# Patient Record
Sex: FEMALE | Race: WHITE | Employment: FULL TIME | URBAN - METROPOLITAN AREA
[De-identification: names, ages, dates, MRNs, and addresses within clinical notes are randomized per-mention and may not be internally consistent; named-entity substitution may affect disease eponyms.]

---

## 2017-06-08 ENCOUNTER — HOSPITAL ENCOUNTER (OUTPATIENT)
Dept: ULTRASOUND IMAGING | Age: 32
Discharge: HOME | End: 2017-06-08
Attending: OBSTETRICS & GYNECOLOGY | Admitting: OBSTETRICS & GYNECOLOGY

## 2017-06-08 DIAGNOSIS — N92.1 BREAKTHROUGH BLEEDING: ICD-10-CM

## 2017-06-08 DIAGNOSIS — N92.1 EXCESSIVE AND FREQUENT MENSTRUATION WITH IRREGULAR CYCLE: ICD-10-CM

## 2017-06-12 ENCOUNTER — OFFICE VISIT (OUTPATIENT)
Dept: FAMILY MEDICINE CLINIC | Age: 32
End: 2017-06-12

## 2017-06-12 VITALS
DIASTOLIC BLOOD PRESSURE: 72 MMHG | RESPIRATION RATE: 14 BRPM | SYSTOLIC BLOOD PRESSURE: 122 MMHG | HEART RATE: 74 BPM | OXYGEN SATURATION: 98 % | BODY MASS INDEX: 18.74 KG/M2 | HEIGHT: 66 IN | WEIGHT: 116.6 LBS

## 2017-06-12 DIAGNOSIS — R31.9 HEMATURIA: Primary | ICD-10-CM

## 2017-06-12 DIAGNOSIS — R93.41 ABNORMAL ULTRASOUND OF BLADDER: ICD-10-CM

## 2017-06-12 DIAGNOSIS — T63.441A LOCAL REACTION TO BEE STING, ACCIDENTAL OR UNINTENTIONAL, INITIAL ENCOUNTER: ICD-10-CM

## 2017-06-12 LAB
BILIRUBIN, POC: NORMAL
BLOOD URINE, POC: NORMAL
CLARITY, POC: CLEAR
COLOR, POC: YELLOW
EPITHELIAL CELLS, UA: 2 /HPF (ref 0–5)
GLUCOSE URINE, POC: NORMAL
HYALINE CASTS: 2 /LPF (ref 0–8)
KETONES, POC: NORMAL
LEUKOCYTE EST, POC: NORMAL
NITRITE, POC: NORMAL
PH, POC: 5.5
PROTEIN, POC: NORMAL
RBC UA: 2 /HPF (ref 0–4)
SPECIFIC GRAVITY, POC: 1.01
UROBILINOGEN, POC: 0.2
WBC UA: 1 /HPF (ref 0–5)

## 2017-06-12 PROCEDURE — 81002 URINALYSIS NONAUTO W/O SCOPE: CPT | Performed by: FAMILY MEDICINE

## 2017-06-12 PROCEDURE — 99214 OFFICE O/P EST MOD 30 MIN: CPT | Performed by: FAMILY MEDICINE

## 2017-06-12 RX ORDER — SULFAMETHOXAZOLE AND TRIMETHOPRIM 800; 160 MG/1; MG/1
1 TABLET ORAL 2 TIMES DAILY
Qty: 14 TABLET | Refills: 0 | Status: SHIPPED | OUTPATIENT
Start: 2017-06-12 | End: 2017-06-19

## 2017-06-12 ASSESSMENT — ENCOUNTER SYMPTOMS
NAUSEA: 0
VOMITING: 0

## 2017-06-14 LAB — URINE CULTURE, ROUTINE: NORMAL

## 2017-06-15 ENCOUNTER — TELEPHONE (OUTPATIENT)
Dept: FAMILY MEDICINE CLINIC | Age: 32
End: 2017-06-15

## 2017-06-15 DIAGNOSIS — N32.89 BLOOD CLOT IN BLADDER: Primary | ICD-10-CM

## 2017-10-09 ENCOUNTER — OFFICE VISIT (OUTPATIENT)
Dept: FAMILY MEDICINE CLINIC | Age: 32
End: 2017-10-09

## 2017-10-09 VITALS
WEIGHT: 113.4 LBS | TEMPERATURE: 98.5 F | BODY MASS INDEX: 18.3 KG/M2 | DIASTOLIC BLOOD PRESSURE: 70 MMHG | OXYGEN SATURATION: 96 % | HEART RATE: 78 BPM | SYSTOLIC BLOOD PRESSURE: 112 MMHG

## 2017-10-09 DIAGNOSIS — R10.11 RUQ PAIN: Primary | ICD-10-CM

## 2017-10-09 PROCEDURE — 99213 OFFICE O/P EST LOW 20 MIN: CPT | Performed by: INTERNAL MEDICINE

## 2017-10-09 ASSESSMENT — PATIENT HEALTH QUESTIONNAIRE - PHQ9
1. LITTLE INTEREST OR PLEASURE IN DOING THINGS: 0
2. FEELING DOWN, DEPRESSED OR HOPELESS: 0
SUM OF ALL RESPONSES TO PHQ QUESTIONS 1-9: 0
SUM OF ALL RESPONSES TO PHQ9 QUESTIONS 1 & 2: 0

## 2017-10-15 ASSESSMENT — ENCOUNTER SYMPTOMS
CONSTIPATION: 0
DIARRHEA: 1
BLOOD IN STOOL: 0
ABDOMINAL PAIN: 1

## 2017-10-15 NOTE — PROGRESS NOTES
Subjective:      Patient ID: Roxanne Pringle is a 27 y.o. female. HPI  Acute visit with complaint of epigastric and right flank pain for the past year without abnormalities uncovered. Would like referral for further evaluation of gallbladder. Notes she will have abdominal cramping and sometimes diarrhea triggered by food. This week brisket from [employer] 3 times. Patient's medications, allergies, past medical, surgical, social and family histories were reviewed and updated as appropriate. Review of Systems   Constitutional: Negative for fever and unexpected weight change. Gastrointestinal: Positive for abdominal pain and diarrhea. Negative for blood in stool and constipation. Objective:   Physical Exam   Constitutional: She is oriented to person, place, and time. She appears well-developed and well-nourished. No distress. Eyes: Conjunctivae are normal. No scleral icterus. Neck: No thyromegaly present. Cardiovascular: Normal rate, regular rhythm and normal heart sounds. No murmur heard. Pulmonary/Chest: Effort normal and breath sounds normal. No respiratory distress. She has no wheezes. Abdominal: Soft. She exhibits no distension and no mass. Bowel sounds are increased. There is no tenderness. There is no rebound and no guarding. Musculoskeletal: She exhibits no edema. Neurological: She is alert and oriented to person, place, and time. Skin: No rash noted. Psychiatric: She has a normal mood and affect. Vitals reviewed. Assessment:      Evelia Lanes was seen today for flank pain. Diagnoses and all orders for this visit:    RUQ pain  -     COMPREHENSIVE METABOLIC PANEL; Future  -     CBC WITH AUTO DIFFERENTIAL; Future  -     US Gallbladder Ruq; Future    check test for gallbladder, but consider IBS and trial of fiber first, consider bentyl or levbid. Plan:      As above and per orders.

## 2017-11-21 ENCOUNTER — HOSPITAL ENCOUNTER (OUTPATIENT)
Dept: SURGERY | Age: 32
Discharge: HOME | End: 2017-11-21
Attending: OBSTETRICS & GYNECOLOGY | Admitting: OBSTETRICS & GYNECOLOGY

## 2017-11-21 VITALS
BODY MASS INDEX: 18.05 KG/M2 | WEIGHT: 115 LBS | SYSTOLIC BLOOD PRESSURE: 100 MMHG | RESPIRATION RATE: 12 BRPM | OXYGEN SATURATION: 100 % | HEART RATE: 64 BPM | TEMPERATURE: 98.1 F | HEIGHT: 67 IN | DIASTOLIC BLOOD PRESSURE: 61 MMHG

## 2017-11-21 LAB — PREGNANCY, URINE: NEGATIVE

## 2017-11-21 RX ORDER — ONDANSETRON 2 MG/ML
4 INJECTION INTRAMUSCULAR; INTRAVENOUS PRN
Status: DISCONTINUED | OUTPATIENT
Start: 2017-11-21 | End: 2017-11-22 | Stop reason: HOSPADM

## 2017-11-21 RX ORDER — SODIUM CHLORIDE, SODIUM LACTATE, POTASSIUM CHLORIDE, CALCIUM CHLORIDE 600; 310; 30; 20 MG/100ML; MG/100ML; MG/100ML; MG/100ML
INJECTION, SOLUTION INTRAVENOUS CONTINUOUS
Status: DISCONTINUED | OUTPATIENT
Start: 2017-11-21 | End: 2017-11-22 | Stop reason: HOSPADM

## 2017-11-21 RX ORDER — LABETALOL HYDROCHLORIDE 5 MG/ML
5 INJECTION, SOLUTION INTRAVENOUS EVERY 10 MIN PRN
Status: DISCONTINUED | OUTPATIENT
Start: 2017-11-21 | End: 2017-11-22 | Stop reason: HOSPADM

## 2017-11-21 RX ORDER — MORPHINE SULFATE 2 MG/ML
2 INJECTION, SOLUTION INTRAMUSCULAR; INTRAVENOUS EVERY 5 MIN PRN
Status: DISCONTINUED | OUTPATIENT
Start: 2017-11-21 | End: 2017-11-22 | Stop reason: HOSPADM

## 2017-11-21 RX ORDER — OXYCODONE HYDROCHLORIDE AND ACETAMINOPHEN 5; 325 MG/1; MG/1
1 TABLET ORAL PRN
Status: ACTIVE | OUTPATIENT
Start: 2017-11-21 | End: 2017-11-21

## 2017-11-21 RX ORDER — OXYCODONE HYDROCHLORIDE AND ACETAMINOPHEN 5; 325 MG/1; MG/1
2 TABLET ORAL PRN
Status: ACTIVE | OUTPATIENT
Start: 2017-11-21 | End: 2017-11-21

## 2017-11-21 RX ORDER — MIDAZOLAM HYDROCHLORIDE 1 MG/ML
INJECTION INTRAMUSCULAR; INTRAVENOUS
Status: DISPENSED
Start: 2017-11-21 | End: 2017-11-21

## 2017-11-21 RX ORDER — DIPHENHYDRAMINE HYDROCHLORIDE 50 MG/ML
12.5 INJECTION INTRAMUSCULAR; INTRAVENOUS
Status: ACTIVE | OUTPATIENT
Start: 2017-11-21 | End: 2017-11-21

## 2017-11-21 RX ORDER — PROMETHAZINE HYDROCHLORIDE 25 MG/ML
6.25 INJECTION, SOLUTION INTRAMUSCULAR; INTRAVENOUS
Status: DISCONTINUED | OUTPATIENT
Start: 2017-11-21 | End: 2017-11-22 | Stop reason: HOSPADM

## 2017-11-21 RX ORDER — MEPERIDINE HYDROCHLORIDE 50 MG/ML
12.5 INJECTION INTRAMUSCULAR; INTRAVENOUS; SUBCUTANEOUS EVERY 5 MIN PRN
Status: DISCONTINUED | OUTPATIENT
Start: 2017-11-21 | End: 2017-11-22 | Stop reason: HOSPADM

## 2017-11-21 RX ORDER — MIDAZOLAM HYDROCHLORIDE 1 MG/ML
2 INJECTION INTRAMUSCULAR; INTRAVENOUS ONCE
Status: COMPLETED | OUTPATIENT
Start: 2017-11-21 | End: 2017-11-21

## 2017-11-21 RX ORDER — MORPHINE SULFATE 2 MG/ML
1 INJECTION, SOLUTION INTRAMUSCULAR; INTRAVENOUS EVERY 5 MIN PRN
Status: DISCONTINUED | OUTPATIENT
Start: 2017-11-21 | End: 2017-11-22 | Stop reason: HOSPADM

## 2017-11-21 RX ORDER — HYDRALAZINE HYDROCHLORIDE 20 MG/ML
5 INJECTION INTRAMUSCULAR; INTRAVENOUS EVERY 10 MIN PRN
Status: DISCONTINUED | OUTPATIENT
Start: 2017-11-21 | End: 2017-11-22 | Stop reason: HOSPADM

## 2017-11-21 RX ADMIN — SODIUM CHLORIDE, SODIUM LACTATE, POTASSIUM CHLORIDE, CALCIUM CHLORIDE: 600; 310; 30; 20 INJECTION, SOLUTION INTRAVENOUS at 10:29

## 2017-11-21 RX ADMIN — MIDAZOLAM HYDROCHLORIDE 2 MG: 1 INJECTION INTRAMUSCULAR; INTRAVENOUS at 10:30

## 2017-11-21 ASSESSMENT — PAIN SCALES - GENERAL
PAINLEVEL_OUTOF10: 0

## 2017-11-21 ASSESSMENT — PAIN - FUNCTIONAL ASSESSMENT: PAIN_FUNCTIONAL_ASSESSMENT: 0-10

## 2017-11-21 ASSESSMENT — PAIN DESCRIPTION - LOCATION: LOCATION: ABDOMEN

## 2017-11-21 NOTE — ANESTHESIA PRE-OP
Department of Anesthesiology  Preprocedure Note       Name:  Hermann Landry   Age:  27 y.o.  :  1985                                          MRN:  [id number]         Date:  2017      Surgeon:    Procedure:    Medications prior to admission:   Prior to Admission medications    Medication Sig Start Date End Date Taking? Authorizing Provider   IBUPROFEN PO Take by mouth as needed    Historical Provider, MD       Current medications:    Current Outpatient Prescriptions   Medication Sig Dispense Refill    IBUPROFEN PO Take by mouth as needed       No current facility-administered medications for this encounter. Allergies: Allergies   Allergen Reactions    Cephalexin      Family History Only    Terazol [Terconazole] Swelling       Problem List:    Patient Active Problem List   Diagnosis Code    Raynaud disease I73.00    Encounter for supervision of normal first pregnancy in third trimester Z34.03    Prolonged latent phase of labor O62.0    40 weeks gestation of pregnancy Z3A.40    Pregnancy with history of infertility in third trimester O09.03    Raynaud's syndrome I73.00       Past Medical History:        Diagnosis Date    Anemia     Endometrial polyp 2011    Gastric reflux     during pregnancy    Infertility, female     iui for pregnancy    Menorrhagia     [place] teeth extracted        Past Surgical History:        Procedure Laterality Date    DILATION AND CURETTAGE OF UTERUS      DILATION AND CURETTAGE OF UTERUS  2012    DILATATION AND CURETTAGE, HYSTEROSCOPY    NASAL SEPTUM SURGERY         Social History:    Social History   Substance Use Topics    Smoking status: Never Smoker    Smokeless tobacco: Never Used      Comment: 7 YRS AGO, RARELY    Alcohol use No      Comment: 1-2 drinks per year                                Counseling given: Not Answered      Vital Signs (Current): There were no vitals filed for this visit.                                            BP Readings from Last 3 Encounters:   10/09/17 112/70   06/12/17 122/72   06/07/16 110/70       NPO Status:                                                                                 BMI:   Wt Readings from Last 3 Encounters:   11/15/17 115 lb (52.2 kg)   10/09/17 113 lb 6.4 oz (51.4 kg)   06/12/17 116 lb 9.6 oz (52.9 kg)     There is no height or weight on file to calculate BMI. Anesthesia Evaluation  Patient summary reviewed and Nursing notes reviewed no history of anesthetic complications:   Airway: Mallampati: II     Neck ROM: full   Dental:          Pulmonary:Negative Pulmonary ROS and normal exam                               Cardiovascular:Negative CV ROS                      Neuro/Psych:   Negative Neuro/Psych ROS              GI/Hepatic/Renal: Neg GI/Hepatic/Renal ROS  (+) GERD: well controlled,      (-) hiatal hernia       Endo/Other: Negative Endo/Other ROS                    Abdominal:           Vascular:                                      Anesthesia Plan      general     ASA 2     (I discussed with the patient the risks and benefits of PIV, general anesthesia, IV Narcotics, PACU. All questions were answered the patient agrees with the plan.)  Induction: intravenous. Pre-Operative Diagnosis: EXCESSIVE FREQUENT MENSTRUATION    27 y.o.   BMI:  Body mass index is 18.28 kg/m².      Vitals:    11/21/17 1021   BP: 130/85   Pulse: 93   Resp: 20   Temp: 98.3 °F (36.8 °C)   TempSrc: Oral   SpO2: 100%   Weight: 115 lb (52.2 kg)   Height: 5' 6.5\" (1.689 m)       Allergies   Allergen Reactions    Cephalexin      Family History Only      Terazol [Terconazole] Swelling       Social History   Substance Use Topics    Smoking status: Never Smoker    Smokeless tobacco: Never Used      Comment: 7 YRS AGO, RARELY    Alcohol use No      Comment: 1-2 drinks per year       LABS:    CBC  Lab Results   Component Value Date/Time    WBC 11.9 (H) 12/31/2015 11:35 PM    HGB 9.5 (L) 01/02/2016 01:21 PM HCT 42.5 12/31/2015 11:35 PM     (L) 12/31/2015 11:35 PM     RENAL  Lab Results   Component Value Date/Time     02/04/2015 09:58 AM    K 4.1 02/04/2015 09:58 AM     02/04/2015 09:58 AM    CO2 27 02/04/2015 09:58 AM    BUN 12 02/04/2015 09:58 AM    CREATININE 0.6 02/04/2015 09:58 AM    GLUCOSE 86 02/04/2015 09:58 AM     COAGS  No results found for: PROTIME, INR, APTT    Makayla Leyva MD   11/21/2017

## 2017-11-21 NOTE — PROGRESS NOTES
Patient arrive in recovery with oral airway in place.  Removed at [phone] -awakened with light touch-=spit airway out on awakening.-oriented rapidly

## 2017-12-08 NOTE — ANESTHESIA POST-OP
Postoperative Anesthesia Note    Name:    Margoth Wilson  MRN:      [id number]    No data found. LABS:    CBC  Lab Results   Component Value Date/Time    WBC 11.9 (H) 12/31/2015 11:35 PM    HGB 9.5 (L) 01/02/2016 01:21 PM    HCT 42.5 12/31/2015 11:35 PM     (L) 12/31/2015 11:35 PM     RENAL  Lab Results   Component Value Date/Time     02/04/2015 09:58 AM    K 4.1 02/04/2015 09:58 AM     02/04/2015 09:58 AM    CO2 27 02/04/2015 09:58 AM    BUN 12 02/04/2015 09:58 AM    CREATININE 0.6 02/04/2015 09:58 AM    GLUCOSE 86 02/04/2015 09:58 AM     COAGS  No results found for: PROTIME, INR, APTT    Intake & Output:  No intake/output data recorded. Nausea & Vomiting:  No    Level of Consciousness:  Awake    Pain Assessment:  Adequate analgesia    Anesthesia Complications:  No apparent anesthetic complications    SUMMARY      Vital signs stable  OK to discharge from Stage I post anesthesia care.   Care transferred from Anesthesiology department on discharge from perioperative area

## 2019-09-20 ENCOUNTER — OFFICE VISIT (OUTPATIENT)
Dept: FAMILY MEDICINE CLINIC | Age: 34
End: 2019-09-20
Payer: COMMERCIAL

## 2019-09-20 VITALS
BODY MASS INDEX: 20.83 KG/M2 | WEIGHT: 125 LBS | HEIGHT: 65 IN | DIASTOLIC BLOOD PRESSURE: 70 MMHG | SYSTOLIC BLOOD PRESSURE: 110 MMHG | HEART RATE: 74 BPM | OXYGEN SATURATION: 98 % | TEMPERATURE: 98.6 F

## 2019-09-20 DIAGNOSIS — Z00.00 ANNUAL PHYSICAL EXAM: Primary | ICD-10-CM

## 2019-09-20 PROCEDURE — 99395 PREV VISIT EST AGE 18-39: CPT | Performed by: INTERNAL MEDICINE

## 2019-09-20 ASSESSMENT — PATIENT HEALTH QUESTIONNAIRE - PHQ9
1. LITTLE INTEREST OR PLEASURE IN DOING THINGS: 0
SUM OF ALL RESPONSES TO PHQ9 QUESTIONS 1 & 2: 0
2. FEELING DOWN, DEPRESSED OR HOPELESS: 0
SUM OF ALL RESPONSES TO PHQ QUESTIONS 1-9: 0
SUM OF ALL RESPONSES TO PHQ QUESTIONS 1-9: 0

## 2019-09-20 NOTE — PROGRESS NOTES
activity: Not on file   Other Topics Concern    Not on file   Social History Narrative    Not on file       Review of Systems:  A comprehensive review of systems was negative except for what was noted in the HPI. Physical Exam:   Vitals:    09/20/19 1442   BP: 110/70   Site: Right Upper Arm   Position: Sitting   Cuff Size: Small Adult   Pulse: 74   Temp: 98.6 °F (37 °C)   TempSrc: Oral   SpO2: 98%   Weight: 125 lb (56.7 kg)   Height: 5' 5.35\" (1.66 m)     Body mass index is 20.58 kg/m². Constitutional: She is oriented to person, place, and time. She appears well-developed and well-nourished. No distress. HEENT:   Head: Normocephalic and atraumatic. Right Ear: Tympanic membrane, external ear and ear canal normal.   Left Ear: Tympanic membrane, external ear and ear canal normal.   Nose: Nose normal.   Mouth/Throat: Oropharynx is clear and moist, and mucous membranes are normal.  There is no cervical adenopathy. Eyes: Conjunctivae and extraocular motions are normal. Pupils are equal, round, and reactive to light. Neck: Neck supple. No JVD present. Carotid bruit is not present. No mass and no thyromegaly present. Cardiovascular: Normal rate, regular rhythm, normal heart sounds and intact distal pulses. Exam reveals no gallop and no friction rub. No murmur heard. Pulmonary/Chest: Effort normal and breath sounds normal. No respiratory distress. She has no wheezes, rhonchi or rales. Abdominal: Soft, non-tender. Bowel sounds and aorta are normal. She exhibits no organomegaly, mass or bruit. Genitourinary: examination not indicated. Breast exam:  performed by specialist.  Musculoskeletal: Normal range of motion, no synovitis. She exhibits no edema. Neurological: She is alert and oriented to person, place, and time. She has normal reflexes. No cranial nerve deficit. Coordination normal.   Skin: Skin is warm and dry. There is no rash or erythema. No suspicious lesions noted.    Psychiatric: She has a normal mood and affect.  Her speech is normal and behavior is normal. Judgment, cognition and memory are normal.     Assessment/Plan:    Leopoldo Dubois was seen today for annual exam.    Diagnoses and all orders for this visit:    Annual physical exam

## 2020-08-19 LAB — PAP SMEAR, EXTERNAL: NEGATIVE

## 2020-10-05 ENCOUNTER — TELEPHONE (OUTPATIENT)
Dept: FAMILY MEDICINE CLINIC | Age: 35
End: 2020-10-05

## 2020-10-05 NOTE — TELEPHONE ENCOUNTER
----- Message from Selenasanjuanita Man sent at 10/5/2020  9:02 AM EDT -----  Subject: Appointment Request    Reason for Call: Routine Physical Exam    QUESTIONS  Type of Appointment? Established Patient  Reason for appointment request? No appointments available during search  Additional Information for Provider? Patient was scheduled for 10/16 for a   appointment but something cam up she stated so she was to reschedule her   appointment.   ---------------------------------------------------------------------------  --------------  [employer]  What is the best way for the office to contact you? OK to leave message on   voicemail  Preferred Call Back Phone Number? [phone]  ---------------------------------------------------------------------------  --------------  SCRIPT ANSWERS  Relationship to Patient? Self  Have your symptoms changed? No  Have you been diagnosed with   tested for   or told that you are suspected of having COVID-19 (Coronavirus)? No  Have you had a fever or taken medication to treat a fever within the past   3 days? No  Have you had a cough   shortness of breath or flu-like symptoms within the past 3 days? No  Do you currently have flu-like symptoms including fever or chills   cough   shortness of breath   or difficulty breathing   or new loss of taste or smell? No  (Service Expert  click yes below to proceed with [employer] As Usual   Scheduling)?  Yes

## 2020-10-23 ENCOUNTER — OFFICE VISIT (OUTPATIENT)
Dept: FAMILY MEDICINE CLINIC | Age: 35
End: 2020-10-23
Payer: COMMERCIAL

## 2020-10-23 VITALS
HEIGHT: 65 IN | DIASTOLIC BLOOD PRESSURE: 72 MMHG | TEMPERATURE: 97.9 F | WEIGHT: 131 LBS | BODY MASS INDEX: 21.83 KG/M2 | OXYGEN SATURATION: 100 % | HEART RATE: 78 BPM | SYSTOLIC BLOOD PRESSURE: 102 MMHG

## 2020-10-23 PROCEDURE — 99395 PREV VISIT EST AGE 18-39: CPT | Performed by: PHYSICIAN ASSISTANT

## 2020-10-23 PROCEDURE — G8484 FLU IMMUNIZE NO ADMIN: HCPCS | Performed by: PHYSICIAN ASSISTANT

## 2020-10-23 ASSESSMENT — ENCOUNTER SYMPTOMS
COUGH: 0
SHORTNESS OF BREATH: 0
CONSTIPATION: 0
SORE THROAT: 0
RHINORRHEA: 0
ABDOMINAL PAIN: 0
VOMITING: 0
DIARRHEA: 0
NAUSEA: 0

## 2020-10-23 ASSESSMENT — PATIENT HEALTH QUESTIONNAIRE - PHQ9
SUM OF ALL RESPONSES TO PHQ9 QUESTIONS 1 & 2: 0
SUM OF ALL RESPONSES TO PHQ QUESTIONS 1-9: 0
SUM OF ALL RESPONSES TO PHQ QUESTIONS 1-9: 0
1. LITTLE INTEREST OR PLEASURE IN DOING THINGS: 0
2. FEELING DOWN, DEPRESSED OR HOPELESS: 0
SUM OF ALL RESPONSES TO PHQ QUESTIONS 1-9: 0

## 2020-10-23 NOTE — PROGRESS NOTES
10/23/2020    Prashanth Chavez (:  1985) is a 38 y.o. female, here for a preventive medicine evaluation. Patient Active Problem List   Diagnosis    Raynaud disease    Encounter for supervision of normal first pregnancy in third trimester    Prolonged latent phase of labor    40 weeks gestation of pregnancy    Pregnancy with history of infertility in third trimester    Raynaud's syndrome     Sleeping well  No current diet or exercise regimen   Up to date on dental and eye exams  Wears seatbelt  Up to date on pap  Due for annual labs- has fingerstick done at [facility]  Declines flu shot- fear of needles    Review of Systems   Constitutional: Negative for activity change, chills and fever. HENT: Negative for congestion, ear pain, rhinorrhea and sore throat. Eyes: Negative for visual disturbance. Respiratory: Negative for cough and shortness of breath. Cardiovascular: Negative for chest pain and palpitations. Gastrointestinal: Negative for abdominal pain, constipation, diarrhea, nausea and vomiting. Genitourinary: Negative for difficulty urinating and dysuria. Musculoskeletal: Negative for arthralgias and myalgias. Skin: Negative for rash. Neurological: Negative for dizziness, weakness and numbness. Psychiatric/Behavioral: Negative for sleep disturbance. Prior to Visit Medications    Medication Sig Taking?  Authorizing Provider   IBUPROFEN PO Take by mouth as needed Yes Historical Provider, MD        Allergies   Allergen Reactions    Cephalexin      Family History Only      Terazol [Terconazole] Swelling       Past Medical History:   Diagnosis Date    Anemia     Endometrial polyp 2011    Gastric reflux     during pregnancy    Infertility, female     iui for pregnancy    Menorrhagia     [place] teeth extracted        Past Surgical History:   Procedure Laterality Date    DILATION AND CURETTAGE OF UTERUS      DILATION AND CURETTAGE OF UTERUS  2012    DILATATION AND CURETTAGE, HYSTEROSCOPY    DILATION AND CURETTAGE OF UTERUS  11/21/2017    NASAL SEPTUM SURGERY         Social History     Socioeconomic History    Marital status:      Spouse name: Not on file    Number of children: Not on file    Years of education: Not on file    Highest education level: Not on file   Occupational History    Not on file   Social Needs    Financial resource strain: Not on file    Food insecurity     Worry: Not on file     Inability: Not on file    Transportation needs     Medical: Not on file     Non-medical: Not on file   Tobacco Use    Smoking status: Never Smoker    Smokeless tobacco: Never Used    Tobacco comment: 7 YRS AGO, RARELY   Substance and Sexual Activity    Alcohol use: No     Comment: 1-2 drinks per year    Drug use: No    Sexual activity: Yes     Partners: Male   Lifestyle    Physical activity     Days per week: Not on file     Minutes per session: Not on file    Stress: Not on file   Relationships    Social connections     Talks on phone: Not on file     Gets together: Not on file     Attends [employer] service: Not on file     Active member of club or organization: Not on file     Attends meetings of clubs or organizations: Not on file     Relationship status: Not on file    Intimate partner violence     Fear of current or ex partner: Not on file     Emotionally abused: Not on file     Physically abused: Not on file     Forced sexual activity: Not on file   Other Topics Concern    Not on file   Social History Narrative    Not on file        Family History   Problem Relation Age of Onset    High Blood Pressure Father     Cancer Maternal Grandmother         Breast    Stroke Maternal Grandfather        ADVANCE DIRECTIVE: N, <no information>    Vitals:    10/23/20 1324   BP: 102/72   Site: Left Upper Arm   Position: Sitting   Cuff Size: Small Adult   Pulse: 78   Temp: 97.9 °F (36.6 °C)   TempSrc: Temporal   SpO2: 100%  Comment: RA   Weight: 131 lb (59.4 kg)   Height: 5' 5\" (1.651 m)     Estimated body mass index is 21.8 kg/m² as calculated from the following:    Height as of this encounter: 5' 5\" (1.651 m). Weight as of this encounter: 131 lb (59.4 kg). Physical Exam  Constitutional:       General: She is not in acute distress. Appearance: She is well-developed. HENT:      Head: Normocephalic and atraumatic. Eyes:      Conjunctiva/sclera: Conjunctivae normal.      Pupils: Pupils are equal, round, and reactive to light. Neck:      Musculoskeletal: Neck supple. Cardiovascular:      Rate and Rhythm: Normal rate and regular rhythm. Heart sounds: Normal heart sounds. No murmur. Pulmonary:      Effort: Pulmonary effort is normal.      Breath sounds: Normal breath sounds. No wheezing. Abdominal:      General: Bowel sounds are normal.      Palpations: Abdomen is soft. Tenderness: There is no abdominal tenderness. Musculoskeletal:      Comments:     Lymphadenopathy:      Cervical: No cervical adenopathy. Skin:     General: Skin is warm and dry. Findings: No rash. Neurological:      Mental Status: She is alert and oriented to person, place, and time. Deep Tendon Reflexes: Reflexes are normal and symmetric. No flowsheet data found. Lab Results   Component Value Date    GLUCOSE 100 01/06/2018       The ASCVD Risk score (Dodie Wilson., et al., 2013) failed to calculate for the following reasons:     The 2013 ASCVD risk score is only valid for ages 36 to 78    Immunization History   Administered Date(s) Administered    Tdap (Boostrix, Adacel) 11/12/2008, 08/21/2018       Health Maintenance   Topic Date Due    Cervical cancer screen  03/01/2017    Flu vaccine (1) 09/01/2020    DTaP/Tdap/Td vaccine (3 - Td) 08/21/2028    HIV screen  Completed    Hepatitis A vaccine  Aged Out    Hepatitis B vaccine  Aged Out    Hib vaccine  Aged Out    Meningococcal (ACWY) vaccine  Aged Out    Pneumococcal 0-64 years Vaccine  Aged Out    Varicella vaccine  Discontinued       ASSESSMENT/PLAN:  1. Annual physical exam  - Patient to have labs drawn elsewhere, will fax lab work. (lipid, a1c)  - Declines flu vaccine today  - UTD on pap, will request records from [facility]- Dr. Aquilino Reyes. Return in about 1 year (around 10/23/2021) for Annual Exam.    An electronic signature was used to authenticate this note.     --MARIAH Geiger on 10/25/2020 at 10:35 PM

## 2020-10-30 ENCOUNTER — TELEPHONE (OUTPATIENT)
Dept: FAMILY MEDICINE CLINIC | Age: 35
End: 2020-10-30

## 2020-10-30 NOTE — TELEPHONE ENCOUNTER
Left message for pt to call back. Have [employer] Form ready for her. No fax number to fax to Employer. She has to turn it into her [employer]. Does she want to , have it emailed to her or faxed somewhere? If faxed or e-mailed, please get e-mail address/fax number.

## 2021-10-07 ENCOUNTER — TELEPHONE (OUTPATIENT)
Dept: FAMILY MEDICINE CLINIC | Age: 36
End: 2021-10-07

## 2021-10-07 ENCOUNTER — OFFICE VISIT (OUTPATIENT)
Dept: FAMILY MEDICINE CLINIC | Age: 36
End: 2021-10-07
Payer: COMMERCIAL

## 2021-10-07 ENCOUNTER — NURSE TRIAGE (OUTPATIENT)
Dept: OTHER | Facility: CLINIC | Age: 36
End: 2021-10-07

## 2021-10-07 ENCOUNTER — HOSPITAL ENCOUNTER (OUTPATIENT)
Dept: CT IMAGING | Age: 36
Discharge: HOME | End: 2021-10-07
Payer: COMMERCIAL

## 2021-10-07 VITALS
OXYGEN SATURATION: 100 % | SYSTOLIC BLOOD PRESSURE: 116 MMHG | WEIGHT: 130.4 LBS | BODY MASS INDEX: 21.7 KG/M2 | DIASTOLIC BLOOD PRESSURE: 76 MMHG | HEART RATE: 71 BPM

## 2021-10-07 DIAGNOSIS — Z13.220 LIPID SCREENING: ICD-10-CM

## 2021-10-07 DIAGNOSIS — E03.8 TSH (THYROID-STIMULATING HORMONE DEFICIENCY): ICD-10-CM

## 2021-10-07 DIAGNOSIS — G44.52 NEW PERSISTENT DAILY HEADACHE: ICD-10-CM

## 2021-10-07 DIAGNOSIS — Z13.29 THYROID DISORDER SCREENING: ICD-10-CM

## 2021-10-07 DIAGNOSIS — G44.52 NEW PERSISTENT DAILY HEADACHE: Primary | ICD-10-CM

## 2021-10-07 DIAGNOSIS — H54.7 ALTERATION IN VISION: ICD-10-CM

## 2021-10-07 LAB
CONTROL: NORMAL
PREGNANCY TEST URINE, POC: NORMAL

## 2021-10-07 PROCEDURE — 99214 OFFICE O/P EST MOD 30 MIN: CPT | Performed by: NURSE PRACTITIONER

## 2021-10-07 PROCEDURE — 81025 URINE PREGNANCY TEST: CPT | Performed by: NURSE PRACTITIONER

## 2021-10-07 PROCEDURE — 70450 CT HEAD/BRAIN W/O DYE: CPT

## 2021-10-07 ASSESSMENT — VISUAL ACUITY: OU: 1

## 2021-10-07 ASSESSMENT — ENCOUNTER SYMPTOMS
COUGH: 0
TROUBLE SWALLOWING: 0
NAUSEA: 0
EYE REDNESS: 0
EYE PAIN: 0
FACIAL SWELLING: 0
VOMITING: 0
SINUS PRESSURE: 0
SHORTNESS OF BREATH: 0
CHEST TIGHTNESS: 0
PHOTOPHOBIA: 1
SINUS PAIN: 0
DIARRHEA: 0
EYE DISCHARGE: 0
WHEEZING: 0
EYE ITCHING: 0
CONSTIPATION: 0

## 2021-10-07 ASSESSMENT — PATIENT HEALTH QUESTIONNAIRE - PHQ9
SUM OF ALL RESPONSES TO PHQ QUESTIONS 1-9: 0
SUM OF ALL RESPONSES TO PHQ QUESTIONS 1-9: 0
SUM OF ALL RESPONSES TO PHQ9 QUESTIONS 1 & 2: 0
2. FEELING DOWN, DEPRESSED OR HOPELESS: 0
1. LITTLE INTEREST OR PLEASURE IN DOING THINGS: 0
SUM OF ALL RESPONSES TO PHQ QUESTIONS 1-9: 0
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
## 2021-10-07 NOTE — TELEPHONE ENCOUNTER
Waqar with [facility] radiology reports CT Head WO Contrast is complete and resulted

## 2021-10-07 NOTE — PROGRESS NOTES
10/7/2021  Rejeana Lanes (: 1985)  43 y.o.    ASSESSMENT and PLAN:  Carolyn Boast was seen today for headache and altered mental status. Diagnoses and all orders for this visit:    New persistent daily headache  -     CT HEAD WO CONTRAST; Future  -     COMPREHENSIVE METABOLIC PANEL; Future    -     POCT urine pregnancy  - CBC; Future  -CT to rule out acute etiology due to new onset of persistent daily headaches, without headache history, with memory lapses/vision change.  -Neuro exam normal  -Can use prn tylenol at onset of headache.   -Educated on stroke symptoms and when to seek immediate evaluation in the ED. Pt verbalized understanding.  -Neuro exam negative    Alteration in vision  -     CT HEAD WO CONTRAST; Future  -Rule out acute etiology  -Possible aura/migraine pattern  -If blood work/imaging normal then may consider MRI or Neuro consult. Lipid screening  -     LIPID PANEL; Future  -Screening    Thyroid disorder screening  -     TSH with Reflex; Future  -Screening    Return if symptoms worsen or fail to improve. HPI  Presenting with new onset headache for the last 6 weeks. Headaches first began when she had Covid. Had covid in mid August which. At first they were more intermittent, but now are becoming more constant and occurring daily. Last week she starting noticing flashes of light (or flickering light sensation) to lower right visual field of right eye. Headaches are occurring mostly daily, seems to come in waves. Sometimes notices flashes/blurriness in right lower visual field with is a flickering sensation that only lasts a few minutes then pressure sensation occurs to head. Duration of headaches varies. Describes headache pain to frontal area/temples, then sometimes just feels pressure all over head. Feels weak when headaches occur sometimes. Denies any gait issues, falls, extremity paralysis or weakness, facial droop, slurred speech, numbness/tingling in extremities. Denies any recent trauma or injury. Denies fevers, nausea, vomiting. If bending over sometimes it feels worse. Sometimes feels worse with light, being in a dark room improves symptoms. States she sometimes doesn't get much sleep but denies anything else besides having covid that has changed in the last few months. Never had headaches or migraines before. Has tried tylenol, no improvement. Took ibuprofen yesterday which helped, but didn't resolve headache. Has been having episodes of getting lost driving described as brain fog feeling. Denies any alcohol use, tobacco use, or illicit drug use. Denies OTC medication use. Denies history of blood clots. Parents have history of hypertension. Denies history of blood clots. Review of Systems   Constitutional: Negative for activity change, appetite change, chills, diaphoresis, fatigue, fever and unexpected weight change. HENT: Negative for congestion, drooling, ear pain, facial swelling, sinus pressure, sinus pain, sneezing and trouble swallowing. Eyes: Positive for photophobia and visual disturbance (flickering lights in right eye). Negative for pain, discharge, redness and itching. Respiratory: Negative for cough, chest tightness, shortness of breath and wheezing. Cardiovascular: Negative for chest pain, palpitations and leg swelling. Gastrointestinal: Negative for constipation, diarrhea, nausea and vomiting. Genitourinary: Negative. Negative for difficulty urinating and dysuria. Musculoskeletal: Negative. Negative for arthralgias, gait problem and myalgias. Neurological: Negative. Negative for dizziness, tremors, seizures, syncope, facial asymmetry, speech difficulty, weakness, light-headedness, numbness and headaches. Psychiatric/Behavioral: Negative. Negative for agitation, behavioral problems, confusion, decreased concentration, dysphoric mood and hallucinations. The patient is not hyperactive.         Allergies, past medical history, family history, and social history reviewed and unchanged from previous encounter. No current outpatient medications on file. No current facility-administered medications for this visit. Vitals:    10/07/21 0956   BP: 116/76   Site: Right Upper Arm   Position: Sitting   Cuff Size: Medium Adult   Pulse: 71   SpO2: 100%   Weight: 130 lb 6.4 oz (59.1 kg)     Estimated body mass index is 21.7 kg/m² as calculated from the following:    Height as of 10/23/20: 5' 5\" (1.651 m). Weight as of this encounter: 130 lb 6.4 oz (59.1 kg). Physical Exam  Vitals reviewed. Constitutional:       Appearance: Normal appearance. She is normal weight. HENT:      Head: Normocephalic and atraumatic. Nose: Nose normal. No congestion or rhinorrhea. Eyes:      General: Lids are normal. Vision grossly intact. Gaze aligned appropriately. Extraocular Movements: Extraocular movements intact. Conjunctiva/sclera: Conjunctivae normal.      Pupils: Pupils are equal, round, and reactive to light. Cardiovascular:      Rate and Rhythm: Normal rate and regular rhythm. Pulses: Normal pulses. Heart sounds: Normal heart sounds. Pulmonary:      Effort: Pulmonary effort is normal.      Breath sounds: Normal breath sounds. No wheezing or rhonchi. Abdominal:      General: Abdomen is flat. Bowel sounds are normal.      Palpations: Abdomen is soft. Tenderness: There is no abdominal tenderness. There is no guarding. Musculoskeletal:         General: Normal range of motion. Cervical back: Normal range of motion and neck supple. Skin:     General: Skin is warm and dry. Capillary Refill: Capillary refill takes less than 2 seconds. Neurological:      General: No focal deficit present. Mental Status: She is alert and oriented to person, place, and time. Mental status is at baseline. Cranial Nerves: No cranial nerve deficit, dysarthria or facial asymmetry. Sensory: No sensory deficit.       Motor: No weakness, tremor, atrophy, abnormal muscle tone, seizure activity or pronator drift. Coordination: Coordination normal. Finger-Nose-Finger Test and Heel to Shin Test normal.      Gait: Gait normal.   Psychiatric:         Mood and Affect: Mood normal.         Behavior: Behavior normal.         Thought Content:  Thought content normal.         Judgment: Judgment normal.

## 2021-10-14 DIAGNOSIS — H53.9 VISUAL DISTURBANCE OF ONE EYE: ICD-10-CM

## 2021-10-14 DIAGNOSIS — R51.9 NEW ONSET HEADACHE: Primary | ICD-10-CM

## 2021-10-15 RX ORDER — BUTALBITAL, ACETAMINOPHEN AND CAFFEINE 50; 325; 40 MG/1; MG/1; MG/1
1 TABLET ORAL EVERY 4 HOURS PRN
Qty: 180 TABLET | Refills: 3 | Status: SHIPPED | OUTPATIENT
Start: 2021-10-15

## 2021-11-02 ENCOUNTER — TELEPHONE (OUTPATIENT)
Dept: FAMILY MEDICINE CLINIC | Age: 36
End: 2021-11-02

## 2021-11-02 NOTE — TELEPHONE ENCOUNTER
----- Message from [place], [place] sent at 11/2/2021  7:33 AM EDT -----  Subject: Appointment Request    Reason for Call: Ear Problem    QUESTIONS  Type of Appointment? Established Patient  Reason for appointment request? No appointments available during search  Additional Information for Provider? Pt needs an urgent appt for a hard   lump on her left ear. Hurts to open jaw. Pt disconnected the call. Please   follow up.  ---------------------------------------------------------------------------  --------------  CALL BACK INFO  What is the best way for the office to contact you? OK to leave message on   voicemail  Preferred Call Back Phone Number? [phone]  ---------------------------------------------------------------------------  --------------  SCRIPT ANSWERS  Relationship to Patient? Self  Did you have an injury or trauma within the past three days? No  Is your pain affecting your daily activities? No  Are you having fevers (100.4), chills or sweats? No  Are you experiencing new onset hearing loss? No  Did your symptoms begin within the last 2 weeks? Yes  Have you been diagnosed with, awaiting test results for, or told that you   are suspected of having COVID-19 (Coronavirus)? (If patient has tested   negative or was tested as a requirement for work, school, or travel and   not based on symptoms, answer no)? No  Within the past two weeks have you developed any of the following symptoms   (answer no if symptoms have been present longer than 2 weeks or began   more than 2 weeks ago)? Fever or Chills, Cough, Shortness of breath or   difficulty breathing, Loss of taste or smell, Sore throat, Nasal   congestion, Sneezing or runny nose, Fatigue or generalized body aches   (answer no if pain is specific to a body part e.g. back pain), Diarrhea,   Headache? No  Have you had close contact with someone with COVID-19 in the last 14 days?    No  (Service Expert  click yes below to proceed with [employer] As Usual Scheduling)?  Yes

## 2021-11-03 ENCOUNTER — OFFICE VISIT (OUTPATIENT)
Dept: FAMILY MEDICINE CLINIC | Age: 36
End: 2021-11-03
Payer: COMMERCIAL

## 2021-11-03 VITALS
SYSTOLIC BLOOD PRESSURE: 100 MMHG | WEIGHT: 131.2 LBS | OXYGEN SATURATION: 98 % | HEART RATE: 79 BPM | BODY MASS INDEX: 21.83 KG/M2 | DIASTOLIC BLOOD PRESSURE: 78 MMHG

## 2021-11-03 DIAGNOSIS — B02.39 SHINGLES OF EYELID: Primary | ICD-10-CM

## 2021-11-03 PROCEDURE — 1036F TOBACCO NON-USER: CPT | Performed by: NURSE PRACTITIONER

## 2021-11-03 PROCEDURE — G8420 CALC BMI NORM PARAMETERS: HCPCS | Performed by: NURSE PRACTITIONER

## 2021-11-03 PROCEDURE — G8427 DOCREV CUR MEDS BY ELIG CLIN: HCPCS | Performed by: NURSE PRACTITIONER

## 2021-11-03 PROCEDURE — G8484 FLU IMMUNIZE NO ADMIN: HCPCS | Performed by: NURSE PRACTITIONER

## 2021-11-03 PROCEDURE — 99213 OFFICE O/P EST LOW 20 MIN: CPT | Performed by: NURSE PRACTITIONER

## 2021-11-03 RX ORDER — VALACYCLOVIR HYDROCHLORIDE 1 G/1
1000 TABLET, FILM COATED ORAL 3 TIMES DAILY
Qty: 21 TABLET | Refills: 0 | Status: SHIPPED | OUTPATIENT
Start: 2021-11-03 | End: 2021-11-10

## 2021-11-03 SDOH — ECONOMIC STABILITY: TRANSPORTATION INSECURITY
IN THE PAST 12 MONTHS, HAS LACK OF TRANSPORTATION KEPT YOU FROM MEETINGS, WORK, OR FROM GETTING THINGS NEEDED FOR DAILY LIVING?: NO

## 2021-11-03 SDOH — ECONOMIC STABILITY: HOUSING INSECURITY
IN THE LAST 12 MONTHS, WAS THERE A TIME WHEN YOU DID NOT HAVE A STEADY PLACE TO SLEEP OR SLEPT IN A SHELTER (INCLUDING NOW)?: NO

## 2021-11-03 SDOH — ECONOMIC STABILITY: INCOME INSECURITY: IN THE LAST 12 MONTHS, WAS THERE A TIME WHEN YOU WERE NOT ABLE TO PAY THE MORTGAGE OR RENT ON TIME?: NO

## 2021-11-03 SDOH — ECONOMIC STABILITY: FOOD INSECURITY: WITHIN THE PAST 12 MONTHS, YOU WORRIED THAT YOUR FOOD WOULD RUN OUT BEFORE YOU GOT MONEY TO BUY MORE.: NEVER TRUE

## 2021-11-03 SDOH — ECONOMIC STABILITY: FOOD INSECURITY: WITHIN THE PAST 12 MONTHS, THE FOOD YOU BOUGHT JUST DIDN'T LAST AND YOU DIDN'T HAVE MONEY TO GET MORE.: NEVER TRUE

## 2021-11-03 SDOH — ECONOMIC STABILITY: TRANSPORTATION INSECURITY
IN THE PAST 12 MONTHS, HAS THE LACK OF TRANSPORTATION KEPT YOU FROM MEDICAL APPOINTMENTS OR FROM GETTING MEDICATIONS?: NO

## 2021-11-03 ASSESSMENT — ENCOUNTER SYMPTOMS
EYE REDNESS: 0
EYE PAIN: 0
EYE ITCHING: 0
EYE DISCHARGE: 0
PHOTOPHOBIA: 0

## 2021-11-03 ASSESSMENT — SOCIAL DETERMINANTS OF HEALTH (SDOH): HOW HARD IS IT FOR YOU TO PAY FOR THE VERY BASICS LIKE FOOD, HOUSING, MEDICAL CARE, AND HEATING?: NOT HARD AT ALL

## 2021-11-03 ASSESSMENT — VISUAL ACUITY: OU: 1

## 2021-11-03 NOTE — PROGRESS NOTES
11/3/2021     Payal Swanson (:  1985) is a 43 y.o. female, here for evaluation of the following medical concerns:    HPI  Pt states that she noticed some pain on the left side of her face and head about a week ago. On Saturday she noticed a fluid filled blister above her left eye. States that it is itchy, burns and tingles. She scratched the blister and clear fluid came out. States that the wound still burns and tingles. She is now having increased pain to the left side of her face and scalp. States that her \"hair follicles hurt\". Wears glasses, goes to [facility] [employer] [facility]. Pt states that she has been under more stress than usual - recent job change. Review of Systems   Constitutional: Negative. Eyes: Negative for photophobia, pain, discharge, redness, itching and visual disturbance. Skin: Positive for wound. Open lesion below left eyebrow. Neurological: Negative. Prior to Visit Medications    Medication Sig Taking? Authorizing Provider   valACYclovir (VALTREX) 1 g tablet Take 1 tablet by mouth 3 times daily for 7 days Yes COLLIN Solano CNP   butalbital-acetaminophen-caffeine (FIORICET, ESGIC) -03 MG per tablet Take 1 tablet by mouth every 4 hours as needed for Headaches  Patient not taking: Reported on 11/3/2021  COLLIN Chris CNP        Social History     Tobacco Use    Smoking status: Never Smoker    Smokeless tobacco: Never Used    Tobacco comment: 7 YRS AGO, RARELY   Substance Use Topics    Alcohol use: No     Comment: 1-2 drinks per year        Vitals:    21 1006   BP: 100/78   Site: Right Upper Arm   Position: Sitting   Cuff Size: Large Adult   Pulse: 79   SpO2: 98%   Weight: 131 lb 3.2 oz (59.5 kg)     Estimated body mass index is 21.83 kg/m² as calculated from the following:    Height as of 10/23/20: 5' 5\" (1.651 m). Weight as of this encounter: 131 lb 3.2 oz (59.5 kg). Physical Exam  Vitals reviewed.    Constitutional: Appearance: Normal appearance. She is normal weight. HENT:      Head: Normocephalic. Eyes:      General: Vision grossly intact. Right eye: No discharge. Left eye: No discharge. Extraocular Movements: Extraocular movements intact. Conjunctiva/sclera: Conjunctivae normal.      Pupils: Pupils are equal, round, and reactive to light. Pulmonary:      Effort: Pulmonary effort is normal.   Musculoskeletal:      Cervical back: Normal range of motion. No rigidity. Lymphadenopathy:      Head:      Right side of head: No submental, submandibular, tonsillar, preauricular, posterior auricular or occipital adenopathy. Left side of head: Preauricular adenopathy present. No submental, submandibular, tonsillar, posterior auricular or occipital adenopathy. Cervical: No cervical adenopathy. Right cervical: No superficial, deep or posterior cervical adenopathy. Left cervical: No superficial, deep or posterior cervical adenopathy. Upper Body:      Right upper body: No supraclavicular adenopathy. Left upper body: No supraclavicular adenopathy. Skin:     Findings: Lesion present. Comments: Open lesion below left eyebrow. Neurological:      General: No focal deficit present. Mental Status: She is alert and oriented to person, place, and time. Mental status is at baseline. Psychiatric:         Mood and Affect: Mood normal.         Behavior: Behavior normal.         Thought Content: Thought content normal.         Judgment: Judgment normal.         ASSESSMENT/PLAN:  1. Shingles of eyelid  Pt states that she is an established pt at [facility] here in our building. I recommended that she go upstairs today to see if they could get her in for an exam since the lesion is near her eye. Recommended that she take the Valtrex as directed and f/u if symptoms persist or worsen. Pt VU and agrees with POC.      - valACYclovir (VALTREX) 1 g tablet;  Take 1 tablet by mouth 3 times daily for 7 days  Dispense: 21 tablet; Refill: 0      Return if symptoms worsen or fail to improve. An electronic signature was used to authenticate this note.     --COLLIN Lopez - CNP on 11/3/2021 at 10:32 AM

## 2021-11-03 NOTE — PATIENT INSTRUCTIONS
Patient Education        Shingles: Care Instructions  Your Care Instructions     Shingles (herpes zoster) causes pain and a blistered rash. The rash can appear anywhere on the body but will be on only one side of the body, the left or right. It will be in a band, a strip, or a small area. The pain can be very severe. Shingles can also cause tingling or itching in the area of the rash. The blisters scab over after a few days and heal in 2 to 4 weeks. Medicines can help you feel better and may help prevent more serious problems caused by shingles. Shingles is caused by the same virus that causes chickenpox. When you have chickenpox, the virus gets into your nerve roots and stays there (becomes dormant) long after you get over the chickenpox. If the virus becomes active again, it can cause shingles. Follow-up care is a key part of your treatment and safety. Be sure to make and go to all appointments, and call your doctor if you are having problems. It's also a good idea to know your test results and keep a list of the medicines you take. How can you care for yourself at home? · Be safe with medicines. Take your medicines exactly as prescribed. Call your doctor if you think you are having a problem with your medicine. Antiviral medicine helps you get better faster. · Try not to scratch or pick at the blisters. They will crust over and fall off on their own if you leave them alone. · Put cool, wet cloths on the area to relieve pain and itching. You can also use calamine lotion. Try not to use so much lotion that it cakes and is hard to get off. · Put cornstarch or baking soda on the sores to help dry them out so they heal faster. · Do not use thick ointment, such as petroleum jelly, on the sores. This will keep them from drying and healing. · To help remove loose crusts, soak them in tap water. This can help decrease oozing, and dry and soothe the skin.   · Take an over-the-counter pain medicine, such as acetaminophen (Tylenol), ibuprofen (Advil, Motrin), or naproxen (Aleve). Read and follow all instructions on the label. · Avoid close contact with people until the blisters have healed. It is very important for you to avoid contact with anyone who has never had chickenpox or the chickenpox vaccine. Pregnant women, young babies, and anyone else who has a hard time fighting infection (such as someone with HIV, diabetes, or cancer) is especially at risk. When should you call for help? Call your doctor now or seek immediate medical care if:    · You have a new or higher fever.     · You have a severe headache and a stiff neck.     · You lose the ability to think clearly.     · The rash spreads to your forehead, nose, eyes, or eyelids.     · You have eye pain, or your vision gets worse.     · You have new pain in your face, or you cannot move the muscles in your face.     · Blisters spread to new parts of your body. Watch closely for changes in your health, and be sure to contact your doctor if:    · The rash has not healed after 2 to 4 weeks.     · You still have pain after the rash has healed. Where can you learn more? Go to [email or web address]://[email or web address].[email or web address]. org and sign in to your [employer] account. Alexia Gracia in the [facility] box to learn more about \"Shingles: Care Instructions. \"     If you do not have an account, please click on the \"Sign Up Now\" link. Current as of: July 1, 2021               Content Version: 13.0  © 2006-2021 Healthwise, Incorporated. Care instructions adapted under license by [facility] ([facility]). If you have questions about a medical condition or this instruction, always ask your healthcare professional. [place] [address] any warranty or liability for your use of this information.

## 2023-04-04 ENCOUNTER — TELEMEDICINE (OUTPATIENT)
Dept: PRIMARY CARE CLINIC | Age: 38
End: 2023-04-04
Payer: COMMERCIAL

## 2023-04-04 DIAGNOSIS — B34.9 HEADACHE DUE TO VIRAL INFECTION: Primary | ICD-10-CM

## 2023-04-04 DIAGNOSIS — Z20.818 EXPOSURE TO STREP THROAT: ICD-10-CM

## 2023-04-04 DIAGNOSIS — R07.89 CHEST TIGHTNESS: ICD-10-CM

## 2023-04-04 DIAGNOSIS — R05.1 ACUTE COUGH: ICD-10-CM

## 2023-04-04 DIAGNOSIS — J02.9 SORE THROAT: ICD-10-CM

## 2023-04-04 DIAGNOSIS — R51.9 HEADACHE DUE TO VIRAL INFECTION: Primary | ICD-10-CM

## 2023-04-04 DIAGNOSIS — R52 BODY ACHES: ICD-10-CM

## 2023-04-04 PROCEDURE — 99213 OFFICE O/P EST LOW 20 MIN: CPT | Performed by: NURSE PRACTITIONER

## 2023-04-04 PROCEDURE — G8427 DOCREV CUR MEDS BY ELIG CLIN: HCPCS | Performed by: NURSE PRACTITIONER

## 2023-04-04 RX ORDER — CEFUROXIME AXETIL 250 MG/1
250 TABLET ORAL 2 TIMES DAILY
Qty: 20 TABLET | Refills: 0 | Status: SHIPPED | OUTPATIENT
Start: 2023-04-04 | End: 2023-04-14

## 2023-04-04 ASSESSMENT — ENCOUNTER SYMPTOMS
COUGH: 1
CHEST TIGHTNESS: 1

## 2023-04-04 NOTE — PROGRESS NOTES
Elsa Maxwell (:  1985) is a Established patient, here for evaluation of the following:Headache, body aches, very slight cough and tightness in the chest that started last night Daughter tested positive for strep yesterday Did not do an at-home COVID test    Assessment & Plan   Below is the assessment and plan developed based on review of pertinent history, physical exam, labs, studies, and medications. 1. Headache due to viral infection  Problem  See patient instructions  Instructed to take Tylenol over-the-counter as the bottle directs    2. Body aches  Problem  Instructed to take Tylenol over-the-counter as the bottle directs    3. Acute cough  Problem  Patient declined any treatment for the cough as it is very slight at the present time  See patient instructions    4. Chest tightness  Problem  Watchful waiting    5. Exposure to strep throat  Problem  Patient declined having a sore throat at this time. She was instructed that if her symptoms worsen and she shows symptoms of a sore throat to MyChart me and we will treat prophylactically due to her daughter having strep throat    Patient my chart messaged me at 5:30pm today with c/o's sore throat  Here is her message:  I woke up after my nap today and my neck and throat hurt. These are new symptoms     Called in   Ceftin 250 mg 1 tablet BID X 10 days # 20 no refills      If patient's symptoms worsen consider:  Strep test or if patient displays a sore throat we can treat prophylactically with amoxicillin because of the positive exposure  COVID test      Follow-up with PCP if symptoms do not improve or if they worsen          Subjective   This is a 76-year-old female patient of Dr. Bryson Milligan consenting to a virtual visit  She has complaints of a headache and body aches and a very slight cough with tightness in the chest that began last night. She states that her daughter tested positive for strep yesterday.   Patient denies having any kind of a sore throat at

## 2023-04-04 NOTE — LETTER
April 4, 2023      Erika Pang, [address] [facility] [address]  [address] [facility]      Patient: Nuria Camarillo   MR Number: [id number]   YOB: 1985   Date of Visit: 4/4/2023       Dear Erika Pang: Thank you for referring Mona Yoder to me for evaluation/treatment. Below are the relevant portions of my assessment and plan of care. See patient chart             If you have questions, please do not hesitate to call me. I look forward to following Zhao Bansalsacha along with you. Sincerely,        COLLIN Cortes CNP    I had the pleasure of seeing Zhao Kang today for a primary care Virtualist video visit. Our team would love your overall feedback on this visit. Please hit shift and click the following link to let us know if the Virtualist service met your expectations. [facility].[employer]. [email or web address]      Electronically signed by COLLIN Cortes CNP on 4/4/23 at 8:38 AM EDT.

## 2023-04-04 NOTE — Clinical Note
I had the pleasure of seeing Melissa Cohen today for a primary care Virtualist video visit. Our team would love your overall feedback on this visit. Please hit shift and click the following link to let us know if the Virtualist service met your expectations. [facility].[employer]. [email or web address]   Electronically signed by COLLIN Frank CNP on 4/4/23 at 8:38 AM EDT.

## 2023-07-10 ENCOUNTER — TELEMEDICINE (OUTPATIENT)
Dept: PRIMARY CARE CLINIC | Age: 38
End: 2023-07-10
Payer: COMMERCIAL

## 2023-07-10 DIAGNOSIS — L03.114 CELLULITIS OF LEFT UPPER EXTREMITY: Primary | ICD-10-CM

## 2023-07-10 DIAGNOSIS — T23.272A: ICD-10-CM

## 2023-07-10 PROCEDURE — 99213 OFFICE O/P EST LOW 20 MIN: CPT | Performed by: NURSE PRACTITIONER

## 2023-07-10 PROCEDURE — G8427 DOCREV CUR MEDS BY ELIG CLIN: HCPCS | Performed by: NURSE PRACTITIONER

## 2023-07-10 RX ORDER — SULFAMETHOXAZOLE AND TRIMETHOPRIM 800; 160 MG/1; MG/1
1 TABLET ORAL 2 TIMES DAILY
Qty: 10 TABLET | Refills: 0 | Status: SHIPPED | OUTPATIENT
Start: 2023-07-10 | End: 2023-07-15

## 2023-07-10 ASSESSMENT — ENCOUNTER SYMPTOMS
COLOR CHANGE: 1
RESPIRATORY NEGATIVE: 1
GASTROINTESTINAL NEGATIVE: 1
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
## 2023-12-10 ENCOUNTER — APPOINTMENT (OUTPATIENT)
Dept: CT IMAGING | Age: 38
End: 2023-12-10
Payer: COMMERCIAL

## 2023-12-10 ENCOUNTER — HOSPITAL ENCOUNTER (EMERGENCY)
Age: 38
Discharge: HOME | End: 2023-12-10
Attending: EMERGENCY MEDICINE
Payer: COMMERCIAL

## 2023-12-10 VITALS
HEART RATE: 82 BPM | TEMPERATURE: 98 F | RESPIRATION RATE: 14 BRPM | BODY MASS INDEX: 21.86 KG/M2 | DIASTOLIC BLOOD PRESSURE: 58 MMHG | SYSTOLIC BLOOD PRESSURE: 91 MMHG | HEIGHT: 65 IN | OXYGEN SATURATION: 100 % | WEIGHT: 131.2 LBS

## 2023-12-10 DIAGNOSIS — R11.2 NAUSEA AND VOMITING, UNSPECIFIED VOMITING TYPE: Primary | ICD-10-CM

## 2023-12-10 DIAGNOSIS — R10.9 ABDOMINAL PAIN, UNSPECIFIED ABDOMINAL LOCATION: ICD-10-CM

## 2023-12-10 LAB
ALBUMIN SERPL-MCNC: 4.4 G/DL (ref 3.4–5)
ALBUMIN/GLOB SERPL: 1.7 {RATIO} (ref 1.1–2.2)
ALP SERPL-CCNC: 53 U/L (ref 40–129)
ALT SERPL-CCNC: 12 U/L (ref 10–40)
AMORPH SED URNS QL MICRO: ABNORMAL /HPF
ANION GAP SERPL CALCULATED.3IONS-SCNC: 9 MMOL/L (ref 3–16)
AST SERPL-CCNC: 17 U/L (ref 15–37)
BACTERIA URNS QL MICRO: ABNORMAL /HPF
BASOPHILS # BLD: 0 K/UL (ref 0–0.2)
BASOPHILS NFR BLD: 0.2 %
BILIRUB SERPL-MCNC: <0.2 MG/DL (ref 0–1)
BILIRUB UR QL STRIP.AUTO: ABNORMAL
BUN SERPL-MCNC: 10 MG/DL (ref 7–20)
CALCIUM SERPL-MCNC: 8.7 MG/DL (ref 8.3–10.6)
CHLORIDE SERPL-SCNC: 103 MMOL/L (ref 99–110)
CLARITY UR: CLEAR
CO2 SERPL-SCNC: 26 MMOL/L (ref 21–32)
COLOR UR: YELLOW
CREAT SERPL-MCNC: <0.5 MG/DL (ref 0.6–1.1)
DEPRECATED RDW RBC AUTO: 15.6 % (ref 12.4–15.4)
EOSINOPHIL # BLD: 0 K/UL (ref 0–0.6)
EOSINOPHIL NFR BLD: 0.2 %
EPI CELLS #/AREA URNS HPF: ABNORMAL /HPF (ref 0–5)
FINE GRAN CASTS #/AREA URNS HPF: ABNORMAL /LPF (ref 0–2)
GFR SERPLBLD CREATININE-BSD FMLA CKD-EPI: >60 ML/MIN/{1.73_M2}
GLUCOSE SERPL-MCNC: 97 MG/DL (ref 70–99)
GLUCOSE UR STRIP.AUTO-MCNC: NEGATIVE MG/DL
HCG SERPL QL: NEGATIVE
HCT VFR BLD AUTO: 33 % (ref 36–48)
HGB BLD-MCNC: 10.7 G/DL (ref 12–16)
HGB UR QL STRIP.AUTO: ABNORMAL
KETONES UR STRIP.AUTO-MCNC: 40 MG/DL
LEUKOCYTE ESTERASE UR QL STRIP.AUTO: NEGATIVE
LIPASE SERPL-CCNC: 22 U/L (ref 13–60)
LYMPHOCYTES # BLD: 0.6 K/UL (ref 1–5.1)
LYMPHOCYTES NFR BLD: 4.5 %
MCH RBC QN AUTO: 25.5 PG (ref 26–34)
MCHC RBC AUTO-ENTMCNC: 32.5 G/DL (ref 31–36)
MCV RBC AUTO: 78.2 FL (ref 80–100)
MONOCYTES # BLD: 1.1 K/UL (ref 0–1.3)
MONOCYTES NFR BLD: 8.2 %
NEUTROPHILS # BLD: 11.6 K/UL (ref 1.7–7.7)
NEUTROPHILS NFR BLD: 86.9 %
NITRITE UR QL STRIP.AUTO: NEGATIVE
PH UR STRIP.AUTO: 6.5 [PH] (ref 5–8)
PLATELET # BLD AUTO: 173 K/UL (ref 135–450)
PMV BLD AUTO: 9.4 FL (ref 5–10.5)
POTASSIUM SERPL-SCNC: 3.9 MMOL/L (ref 3.5–5.1)
PROT SERPL-MCNC: 7 G/DL (ref 6.4–8.2)
PROT UR STRIP.AUTO-MCNC: ABNORMAL MG/DL
RBC # BLD AUTO: 4.22 M/UL (ref 4–5.2)
RBC #/AREA URNS HPF: ABNORMAL /HPF (ref 0–4)
RENAL EPI CELLS #/AREA UR COMP ASSIST: ABNORMAL /HPF (ref 0–1)
SODIUM SERPL-SCNC: 138 MMOL/L (ref 136–145)
SP GR UR STRIP.AUTO: 1.02 (ref 1–1.03)
UA COMPLETE W REFLEX CULTURE PNL UR: ABNORMAL
UA DIPSTICK W REFLEX MICRO PNL UR: YES
URN SPEC COLLECT METH UR: ABNORMAL
UROBILINOGEN UR STRIP-ACNC: 0.2 E.U./DL
WBC # BLD AUTO: 13.3 K/UL (ref 4–11)
WBC #/AREA URNS HPF: ABNORMAL /HPF (ref 0–5)

## 2023-12-10 PROCEDURE — 84703 CHORIONIC GONADOTROPIN ASSAY: CPT

## 2023-12-10 PROCEDURE — 74177 CT ABD & PELVIS W/CONTRAST: CPT

## 2023-12-10 PROCEDURE — 36415 COLL VENOUS BLD VENIPUNCTURE: CPT

## 2023-12-10 PROCEDURE — 99285 EMERGENCY DEPT VISIT HI MDM: CPT

## 2023-12-10 PROCEDURE — 83690 ASSAY OF LIPASE: CPT

## 2023-12-10 PROCEDURE — 81001 URINALYSIS AUTO W/SCOPE: CPT

## 2023-12-10 PROCEDURE — 80053 COMPREHEN METABOLIC PANEL: CPT

## 2023-12-10 PROCEDURE — 6360000004 HC RX CONTRAST MEDICATION: Performed by: EMERGENCY MEDICINE

## 2023-12-10 PROCEDURE — 2580000003 HC RX 258: Performed by: EMERGENCY MEDICINE

## 2023-12-10 PROCEDURE — 96374 THER/PROPH/DIAG INJ IV PUSH: CPT

## 2023-12-10 PROCEDURE — 6360000002 HC RX W HCPCS: Performed by: EMERGENCY MEDICINE

## 2023-12-10 PROCEDURE — 85025 COMPLETE CBC W/AUTO DIFF WBC: CPT

## 2023-12-10 RX ORDER — ONDANSETRON 2 MG/ML
4 INJECTION INTRAMUSCULAR; INTRAVENOUS ONCE
Status: COMPLETED | OUTPATIENT
Start: 2023-12-10 | End: 2023-12-10

## 2023-12-10 RX ORDER — 0.9 % SODIUM CHLORIDE 0.9 %
1000 INTRAVENOUS SOLUTION INTRAVENOUS ONCE
Status: COMPLETED | OUTPATIENT
Start: 2023-12-10 | End: 2023-12-10

## 2023-12-10 RX ORDER — ONDANSETRON 4 MG/1
4 TABLET, ORALLY DISINTEGRATING ORAL 3 TIMES DAILY PRN
Qty: 6 TABLET | Refills: 0 | Status: SHIPPED | OUTPATIENT
Start: 2023-12-10

## 2023-12-10 RX ADMIN — IOPAMIDOL 65 ML: 755 INJECTION, SOLUTION INTRAVENOUS at 17:52

## 2023-12-10 RX ADMIN — ONDANSETRON 4 MG: 2 INJECTION INTRAMUSCULAR; INTRAVENOUS at 17:06

## 2023-12-10 RX ADMIN — SODIUM CHLORIDE 1000 ML: 9 INJECTION, SOLUTION INTRAVENOUS at 17:06

## 2023-12-10 ASSESSMENT — PAIN - FUNCTIONAL ASSESSMENT: PAIN_FUNCTIONAL_ASSESSMENT: 0-10

## 2023-12-10 ASSESSMENT — PAIN SCALES - GENERAL: PAINLEVEL_OUTOF10: 2

## 2023-12-10 ASSESSMENT — PAIN DESCRIPTION - LOCATION: LOCATION: ABDOMEN

## 2023-12-10 NOTE — DISCHARGE INSTRUCTIONS
Please follow up with your OB GYN on Wednesday or earlier for further evaluation and treatment.  You were found to have enlarged fluid filled endometrial cavity and bilateral ovarian cyst.

## 2023-12-10 NOTE — ED PROVIDER NOTES
extremities. PSYCHIATRIC: Pleasant. Normal mood and affect. DIAGNOSTIC RESULTS   LABS:  Labs Reviewed   COMPREHENSIVE METABOLIC PANEL - Abnormal; Notable for the following components:       Result Value    Creatinine <0.5 (*)     All other components within normal limits   CBC WITH AUTO DIFFERENTIAL - Abnormal; Notable for the following components:    WBC 13.3 (*)     Hemoglobin 10.7 (*)     Hematocrit 33.0 (*)     MCV 78.2 (*)     MCH 25.5 (*)     RDW 15.6 (*)     Neutrophils Absolute 11.6 (*)     Lymphocytes Absolute 0.6 (*)     All other components within normal limits   URINALYSIS WITH REFLEX TO CULTURE - Abnormal; Notable for the following components:    Bilirubin Urine SMALL (*)     Ketones, Urine 40 (*)     Blood, Urine SMALL (*)     Protein, UA TRACE (*)     All other components within normal limits   MICROSCOPIC URINALYSIS - Abnormal; Notable for the following components:    RBC, UA 5-10 (*)     Bacteria, UA Rare (*)     All other components within normal limits   HCG, SERUM, QUALITATIVE   LIPASE       When ordered only abnormal lab results are displayed. All other labs were within normal range or not returned as of this dictation. RADIOLOGY:     Non-plain film images such as CT, Ultrasound and MRI are read by the radiologist. Plain radiographic images personally reviewed. Interpretation per the Radiologist below, if available at the time of this note:  CT ABDOMEN PELVIS W IV CONTRAST Additional Contrast? None   Final Result   1. No acute abnormality of the abdomen is identified. 2. Enlarged fluid-filled endometrial cavity, with bilateral ovarian cysts and   small pelvic free fluid. Follow-up pelvic ultrasound may be obtained for   further evaluation. No results found. Bedside Ultrasound, as interpreted by me, if performed:    No results found.     PROCEDURES     Unless otherwise noted below, none     Procedures    CRITICAL CARE TIME     I personally spent a total of 0 minutes

## 2023-12-15 ENCOUNTER — HOSPITAL ENCOUNTER (OUTPATIENT)
Dept: ULTRASOUND IMAGING | Age: 38
Discharge: HOME | End: 2023-12-15
Attending: OBSTETRICS & GYNECOLOGY
Payer: COMMERCIAL

## 2023-12-15 DIAGNOSIS — N84.0 POLYP OF CORPUS UTERI: ICD-10-CM

## 2023-12-15 DIAGNOSIS — N92.1 EXCESSIVE AND FREQUENT MENSTRUATION WITH IRREGULAR CYCLE: ICD-10-CM

## 2023-12-15 PROCEDURE — 76856 US EXAM PELVIC COMPLETE: CPT

## 2024-02-07 ENCOUNTER — OFFICE VISIT (OUTPATIENT)
Dept: FAMILY MEDICINE CLINIC | Age: 39
End: 2024-02-07
Payer: COMMERCIAL

## 2024-02-07 VITALS
BODY MASS INDEX: 21.66 KG/M2 | WEIGHT: 130 LBS | HEIGHT: 65 IN | DIASTOLIC BLOOD PRESSURE: 86 MMHG | OXYGEN SATURATION: 100 % | HEART RATE: 77 BPM | SYSTOLIC BLOOD PRESSURE: 118 MMHG

## 2024-02-07 DIAGNOSIS — R07.89 CHEST TIGHTNESS: ICD-10-CM

## 2024-02-07 DIAGNOSIS — R20.2 NUMBNESS AND TINGLING: Primary | ICD-10-CM

## 2024-02-07 DIAGNOSIS — R20.0 NUMBNESS AND TINGLING: Primary | ICD-10-CM

## 2024-02-07 PROCEDURE — 93000 ELECTROCARDIOGRAM COMPLETE: CPT | Performed by: NURSE PRACTITIONER

## 2024-02-07 PROCEDURE — G8427 DOCREV CUR MEDS BY ELIG CLIN: HCPCS | Performed by: NURSE PRACTITIONER

## 2024-02-07 PROCEDURE — G8420 CALC BMI NORM PARAMETERS: HCPCS | Performed by: NURSE PRACTITIONER

## 2024-02-07 PROCEDURE — 99213 OFFICE O/P EST LOW 20 MIN: CPT | Performed by: NURSE PRACTITIONER

## 2024-02-07 PROCEDURE — 1036F TOBACCO NON-USER: CPT | Performed by: NURSE PRACTITIONER

## 2024-02-07 PROCEDURE — G8484 FLU IMMUNIZE NO ADMIN: HCPCS | Performed by: NURSE PRACTITIONER

## 2024-02-07 SDOH — ECONOMIC STABILITY: FOOD INSECURITY: WITHIN THE PAST 12 MONTHS, YOU WORRIED THAT YOUR FOOD WOULD RUN OUT BEFORE YOU GOT MONEY TO BUY MORE.: NEVER TRUE

## 2024-02-07 SDOH — ECONOMIC STABILITY: FOOD INSECURITY: WITHIN THE PAST 12 MONTHS, THE FOOD YOU BOUGHT JUST DIDN'T LAST AND YOU DIDN'T HAVE MONEY TO GET MORE.: NEVER TRUE

## 2024-02-07 SDOH — ECONOMIC STABILITY: INCOME INSECURITY: HOW HARD IS IT FOR YOU TO PAY FOR THE VERY BASICS LIKE FOOD, HOUSING, MEDICAL CARE, AND HEATING?: NOT HARD AT ALL

## 2024-02-07 NOTE — PROGRESS NOTES
2024  Caitlin Penny (: 1985)  38 y.o.    ASSESSMENT and PLAN:  Caitlin was seen today for numbness and influenza.    Diagnoses and all orders for this visit:    Numbness and tingling  -     EKG 12 Lead - Clinic Performed  -     Comprehensive Metabolic Panel; Future  -     CBC; Future  -     TSH with Reflex; Future    Chest tightness    Ekg within normal limits.   Update labs   Alarm symptoms discussed at length.   May be musculoskeletal or post viral related, try conservative measures-avoiding lifting heavy items.   If symptoms do not improve in 1-2 wks would pursue further evaluation. Exam unremarkable.     Return if symptoms worsen or fail to improve.    HPI  Presenting with chest numbness.   Symptom onset 3 days.   Associated symptoms include left shoulder pain at times.  Characteristic feels like tingling, last for a few seconds occurs intermittently.   Aggravating factors -no  Relieving factors -no   Treatments tried nothing yet.   Have you experienced this before-no  Recently had flu 3 wks ago.   Denies sob, palpitations, cp, johnson.   Denies nausea, vomiting upset stomach.   Appetite normal.    Family hx of cardiac issues-both grandfathers, and dad has high blood pressure.      Review of Systems   Constitutional:  Negative for activity change, appetite change, fatigue, fever and unexpected weight change.   Respiratory:  Negative for cough, chest tightness, shortness of breath and wheezing.    Cardiovascular:  Negative for chest pain, palpitations and leg swelling.   Gastrointestinal:  Negative for constipation, diarrhea, nausea and vomiting.   Genitourinary: Negative.  Negative for difficulty urinating and dysuria.   Musculoskeletal: Negative.  Negative for gait problem.   Neurological: Negative.  Negative for dizziness, syncope, weakness, light-headedness, numbness and headaches.   Psychiatric/Behavioral: Negative.         Allergies, past medical history, family history, and social history reviewed

## 2024-07-31 ENCOUNTER — OFFICE VISIT (OUTPATIENT)
Dept: FAMILY MEDICINE CLINIC | Age: 39
End: 2024-07-31
Payer: COMMERCIAL

## 2024-07-31 VITALS
WEIGHT: 143 LBS | OXYGEN SATURATION: 98 % | SYSTOLIC BLOOD PRESSURE: 118 MMHG | HEART RATE: 70 BPM | BODY MASS INDEX: 23.8 KG/M2 | DIASTOLIC BLOOD PRESSURE: 70 MMHG

## 2024-07-31 DIAGNOSIS — Q18.1 CYST ON EAR: Primary | ICD-10-CM

## 2024-07-31 DIAGNOSIS — R51.9 NEW ONSET HEADACHE: ICD-10-CM

## 2024-07-31 DIAGNOSIS — H61.891 NODULE OF RIGHT EXTERNAL EAR: ICD-10-CM

## 2024-07-31 PROCEDURE — 99212 OFFICE O/P EST SF 10 MIN: CPT | Performed by: NURSE PRACTITIONER

## 2024-07-31 PROCEDURE — 1036F TOBACCO NON-USER: CPT | Performed by: NURSE PRACTITIONER

## 2024-07-31 PROCEDURE — G8427 DOCREV CUR MEDS BY ELIG CLIN: HCPCS | Performed by: NURSE PRACTITIONER

## 2024-07-31 PROCEDURE — G8420 CALC BMI NORM PARAMETERS: HCPCS | Performed by: NURSE PRACTITIONER

## 2024-07-31 RX ORDER — BUTALBITAL, ACETAMINOPHEN AND CAFFEINE 50; 325; 40 MG/1; MG/1; MG/1
1 TABLET ORAL EVERY 4 HOURS PRN
Qty: 180 TABLET | Refills: 3 | Status: SHIPPED | OUTPATIENT
Start: 2024-07-31

## 2024-07-31 RX ORDER — PROGESTERONE 200 MG/1
200 CAPSULE ORAL DAILY
COMMUNITY
Start: 2024-03-12

## 2024-07-31 ASSESSMENT — ENCOUNTER SYMPTOMS
SHORTNESS OF BREATH: 0
NAUSEA: 0
VOMITING: 0
CHEST TIGHTNESS: 0
WHEEZING: 0
CONSTIPATION: 0
DIARRHEA: 0
COUGH: 0

## 2024-07-31 ASSESSMENT — ANXIETY QUESTIONNAIRES
4. TROUBLE RELAXING: NOT AT ALL
GAD7 TOTAL SCORE: 0
6. BECOMING EASILY ANNOYED OR IRRITABLE: NOT AT ALL
2. NOT BEING ABLE TO STOP OR CONTROL WORRYING: NOT AT ALL
7. FEELING AFRAID AS IF SOMETHING AWFUL MIGHT HAPPEN: NOT AT ALL
3. WORRYING TOO MUCH ABOUT DIFFERENT THINGS: NOT AT ALL
5. BEING SO RESTLESS THAT IT IS HARD TO SIT STILL: NOT AT ALL
1. FEELING NERVOUS, ANXIOUS, OR ON EDGE: NOT AT ALL
IF YOU CHECKED OFF ANY PROBLEMS ON THIS QUESTIONNAIRE, HOW DIFFICULT HAVE THESE PROBLEMS MADE IT FOR YOU TO DO YOUR WORK, TAKE CARE OF THINGS AT HOME, OR GET ALONG WITH OTHER PEOPLE: NOT DIFFICULT AT ALL

## 2024-07-31 ASSESSMENT — PATIENT HEALTH QUESTIONNAIRE - PHQ9
SUM OF ALL RESPONSES TO PHQ QUESTIONS 1-9: 0
4. FEELING TIRED OR HAVING LITTLE ENERGY: NOT AT ALL
SUM OF ALL RESPONSES TO PHQ9 QUESTIONS 1 & 2: 0
8. MOVING OR SPEAKING SO SLOWLY THAT OTHER PEOPLE COULD HAVE NOTICED. OR THE OPPOSITE, BEING SO FIGETY OR RESTLESS THAT YOU HAVE BEEN MOVING AROUND A LOT MORE THAN USUAL: NOT AT ALL
7. TROUBLE CONCENTRATING ON THINGS, SUCH AS READING THE NEWSPAPER OR WATCHING TELEVISION: NOT AT ALL
SUM OF ALL RESPONSES TO PHQ QUESTIONS 1-9: 0
1. LITTLE INTEREST OR PLEASURE IN DOING THINGS: NOT AT ALL
SUM OF ALL RESPONSES TO PHQ QUESTIONS 1-9: 0
5. POOR APPETITE OR OVEREATING: NOT AT ALL
2. FEELING DOWN, DEPRESSED OR HOPELESS: NOT AT ALL
10. IF YOU CHECKED OFF ANY PROBLEMS, HOW DIFFICULT HAVE THESE PROBLEMS MADE IT FOR YOU TO DO YOUR WORK, TAKE CARE OF THINGS AT HOME, OR GET ALONG WITH OTHER PEOPLE: NOT DIFFICULT AT ALL
SUM OF ALL RESPONSES TO PHQ QUESTIONS 1-9: 0
9. THOUGHTS THAT YOU WOULD BE BETTER OFF DEAD, OR OF HURTING YOURSELF: NOT AT ALL
3. TROUBLE FALLING OR STAYING ASLEEP: NOT AT ALL
6. FEELING BAD ABOUT YOURSELF - OR THAT YOU ARE A FAILURE OR HAVE LET YOURSELF OR YOUR FAMILY DOWN: NOT AT ALL

## 2024-08-22 ENCOUNTER — PATIENT MESSAGE (OUTPATIENT)
Dept: FAMILY MEDICINE CLINIC | Age: 39
End: 2024-08-22

## 2024-08-22 NOTE — TELEPHONE ENCOUNTER
Attempted to LMOM but VM full  for pt to return phone call to the office to schedule an apptointment

## 2024-08-22 NOTE — TELEPHONE ENCOUNTER
Patient calling regarding the \"possible\" cyst behind right ear stating the lump has not changed at all since visit on 7/31/24, Not tender to touch, still soft. Patient wanting to know if provider could send antibiotics to the pharmacy for this, Writer informed patient provider will most likely want to see her in office for this, She is fine with that to.    Please advise.  Thanks.

## 2024-08-23 ENCOUNTER — OFFICE VISIT (OUTPATIENT)
Dept: FAMILY MEDICINE CLINIC | Age: 39
End: 2024-08-23
Payer: COMMERCIAL

## 2024-08-23 VITALS
DIASTOLIC BLOOD PRESSURE: 68 MMHG | WEIGHT: 137 LBS | BODY MASS INDEX: 22.82 KG/M2 | SYSTOLIC BLOOD PRESSURE: 108 MMHG | OXYGEN SATURATION: 99 % | HEART RATE: 78 BPM | HEIGHT: 65 IN | TEMPERATURE: 97.3 F

## 2024-08-23 DIAGNOSIS — R59.0 POSTERIOR AURICULAR LYMPHADENOPATHY: Primary | ICD-10-CM

## 2024-08-23 PROCEDURE — G8420 CALC BMI NORM PARAMETERS: HCPCS | Performed by: PHYSICIAN ASSISTANT

## 2024-08-23 PROCEDURE — 1036F TOBACCO NON-USER: CPT | Performed by: PHYSICIAN ASSISTANT

## 2024-08-23 PROCEDURE — 99214 OFFICE O/P EST MOD 30 MIN: CPT | Performed by: PHYSICIAN ASSISTANT

## 2024-08-23 PROCEDURE — G8427 DOCREV CUR MEDS BY ELIG CLIN: HCPCS | Performed by: PHYSICIAN ASSISTANT

## 2024-08-23 RX ORDER — AMOXICILLIN AND CLAVULANATE POTASSIUM 875; 125 MG/1; MG/1
1 TABLET, FILM COATED ORAL 2 TIMES DAILY
Qty: 20 TABLET | Refills: 0 | Status: SHIPPED | OUTPATIENT
Start: 2024-08-23 | End: 2024-09-02

## 2024-08-23 SDOH — ECONOMIC STABILITY: FOOD INSECURITY: WITHIN THE PAST 12 MONTHS, THE FOOD YOU BOUGHT JUST DIDN'T LAST AND YOU DIDN'T HAVE MONEY TO GET MORE.: NEVER TRUE

## 2024-08-23 SDOH — ECONOMIC STABILITY: FOOD INSECURITY: WITHIN THE PAST 12 MONTHS, YOU WORRIED THAT YOUR FOOD WOULD RUN OUT BEFORE YOU GOT MONEY TO BUY MORE.: NEVER TRUE

## 2024-08-23 SDOH — ECONOMIC STABILITY: INCOME INSECURITY: HOW HARD IS IT FOR YOU TO PAY FOR THE VERY BASICS LIKE FOOD, HOUSING, MEDICAL CARE, AND HEATING?: NOT HARD AT ALL

## 2024-08-23 ASSESSMENT — ENCOUNTER SYMPTOMS
CONSTIPATION: 0
RHINORRHEA: 0
ABDOMINAL PAIN: 0
SHORTNESS OF BREATH: 0
COUGH: 0
VOMITING: 0
NAUSEA: 0
SORE THROAT: 0
DIARRHEA: 0

## 2024-08-23 ASSESSMENT — PATIENT HEALTH QUESTIONNAIRE - PHQ9
2. FEELING DOWN, DEPRESSED OR HOPELESS: NOT AT ALL
SUM OF ALL RESPONSES TO PHQ QUESTIONS 1-9: 0
9. THOUGHTS THAT YOU WOULD BE BETTER OFF DEAD, OR OF HURTING YOURSELF: NOT AT ALL
SUM OF ALL RESPONSES TO PHQ QUESTIONS 1-9: 0
8. MOVING OR SPEAKING SO SLOWLY THAT OTHER PEOPLE COULD HAVE NOTICED. OR THE OPPOSITE, BEING SO FIGETY OR RESTLESS THAT YOU HAVE BEEN MOVING AROUND A LOT MORE THAN USUAL: NOT AT ALL
5. POOR APPETITE OR OVEREATING: NOT AT ALL
6. FEELING BAD ABOUT YOURSELF - OR THAT YOU ARE A FAILURE OR HAVE LET YOURSELF OR YOUR FAMILY DOWN: NOT AT ALL
7. TROUBLE CONCENTRATING ON THINGS, SUCH AS READING THE NEWSPAPER OR WATCHING TELEVISION: NOT AT ALL
3. TROUBLE FALLING OR STAYING ASLEEP: NOT AT ALL
SUM OF ALL RESPONSES TO PHQ QUESTIONS 1-9: 0
1. LITTLE INTEREST OR PLEASURE IN DOING THINGS: NOT AT ALL
SUM OF ALL RESPONSES TO PHQ QUESTIONS 1-9: 0
10. IF YOU CHECKED OFF ANY PROBLEMS, HOW DIFFICULT HAVE THESE PROBLEMS MADE IT FOR YOU TO DO YOUR WORK, TAKE CARE OF THINGS AT HOME, OR GET ALONG WITH OTHER PEOPLE: NOT DIFFICULT AT ALL
4. FEELING TIRED OR HAVING LITTLE ENERGY: NOT AT ALL
SUM OF ALL RESPONSES TO PHQ9 QUESTIONS 1 & 2: 0

## 2024-08-23 ASSESSMENT — ANXIETY QUESTIONNAIRES
6. BECOMING EASILY ANNOYED OR IRRITABLE: NOT AT ALL
4. TROUBLE RELAXING: NOT AT ALL
3. WORRYING TOO MUCH ABOUT DIFFERENT THINGS: NOT AT ALL
2. NOT BEING ABLE TO STOP OR CONTROL WORRYING: NOT AT ALL
7. FEELING AFRAID AS IF SOMETHING AWFUL MIGHT HAPPEN: NOT AT ALL
GAD7 TOTAL SCORE: 0
1. FEELING NERVOUS, ANXIOUS, OR ON EDGE: NOT AT ALL
5. BEING SO RESTLESS THAT IT IS HARD TO SIT STILL: NOT AT ALL
IF YOU CHECKED OFF ANY PROBLEMS ON THIS QUESTIONNAIRE, HOW DIFFICULT HAVE THESE PROBLEMS MADE IT FOR YOU TO DO YOUR WORK, TAKE CARE OF THINGS AT HOME, OR GET ALONG WITH OTHER PEOPLE: NOT DIFFICULT AT ALL

## 2024-08-23 NOTE — PROGRESS NOTES
2024  Caitlin Penny (: 1985)  39 y.o.    ASSESSMENT and PLAN:  Caitlin was seen today for other.    Diagnoses and all orders for this visit:    Posterior auricular lymphadenopathy  -     CBC with Auto Differential; Future  -     Basic Metabolic Panel; Future  -     US SOFT TISSUE LIMITED AREA; Future  -     amoxicillin-clavulanate (AUGMENTIN) 875-125 MG per tablet; Take 1 tablet by mouth 2 times daily for 10 days  - unclear etiology, I suspect posterior auricular lymphadenopathy. Trial abx if no improvement would proceed with blood work and imaging as ordered.       Return if symptoms worsen or fail to improve.    HPI  Patient presents for reevaluation of \"bump behind right ear.\"  Has been there for about a month.   Some increased congestion, metal taste in her mouth.   Denies other lumps/bumps  Denies vision changes, smells, headaches  Tender with palpation, her glasses bother her at times.     Review of Systems   Constitutional:  Negative for activity change, chills and fever.   HENT:  Negative for congestion, ear pain, rhinorrhea and sore throat.    Eyes:  Negative for visual disturbance.   Respiratory:  Negative for cough and shortness of breath.    Cardiovascular:  Negative for chest pain and palpitations.   Gastrointestinal:  Negative for abdominal pain, constipation, diarrhea, nausea and vomiting.   Genitourinary:  Negative for difficulty urinating and dysuria.   Musculoskeletal:  Negative for arthralgias and myalgias.   Skin:  Positive for wound. Negative for rash.   Neurological:  Negative for dizziness, weakness and numbness.   Psychiatric/Behavioral:  Negative for sleep disturbance.        Allergies, past medical history, family history, and social history reviewed and unchanged from previous encounter.     Current Outpatient Medications   Medication Sig Dispense Refill    amoxicillin-clavulanate (AUGMENTIN) 875-125 MG per tablet Take 1 tablet by mouth 2 times daily for 10 days 20 tablet 0

## 2024-08-28 ENCOUNTER — PATIENT MESSAGE (OUTPATIENT)
Dept: FAMILY MEDICINE CLINIC | Age: 39
End: 2024-08-28

## 2024-08-28 DIAGNOSIS — B37.9 ANTIBIOTIC-INDUCED YEAST INFECTION: Primary | ICD-10-CM

## 2024-08-28 DIAGNOSIS — T36.95XA ANTIBIOTIC-INDUCED YEAST INFECTION: Primary | ICD-10-CM

## 2024-08-28 RX ORDER — FLUCONAZOLE 150 MG/1
150 TABLET ORAL ONCE
Qty: 2 TABLET | Refills: 0 | Status: SHIPPED | OUTPATIENT
Start: 2024-08-28 | End: 2024-08-28

## 2024-09-03 ENCOUNTER — HOSPITAL ENCOUNTER (OUTPATIENT)
Dept: ULTRASOUND IMAGING | Age: 39
Discharge: HOME | End: 2024-09-03
Payer: COMMERCIAL

## 2024-09-03 DIAGNOSIS — R59.0 POSTERIOR AURICULAR LYMPHADENOPATHY: ICD-10-CM

## 2024-09-03 PROCEDURE — 76536 US EXAM OF HEAD AND NECK: CPT

## 2024-09-14 ENCOUNTER — HOSPITAL ENCOUNTER (EMERGENCY)
Age: 39
Discharge: HOME | End: 2024-09-14
Attending: STUDENT IN AN ORGANIZED HEALTH CARE EDUCATION/TRAINING PROGRAM
Payer: COMMERCIAL

## 2024-09-14 ENCOUNTER — APPOINTMENT (OUTPATIENT)
Dept: GENERAL RADIOLOGY | Age: 39
End: 2024-09-14
Payer: COMMERCIAL

## 2024-09-14 ENCOUNTER — APPOINTMENT (OUTPATIENT)
Dept: CT IMAGING | Age: 39
End: 2024-09-14
Payer: COMMERCIAL

## 2024-09-14 VITALS
HEART RATE: 73 BPM | TEMPERATURE: 97 F | WEIGHT: 135 LBS | HEIGHT: 67 IN | RESPIRATION RATE: 16 BRPM | BODY MASS INDEX: 21.19 KG/M2 | OXYGEN SATURATION: 100 % | SYSTOLIC BLOOD PRESSURE: 119 MMHG | DIASTOLIC BLOOD PRESSURE: 78 MMHG

## 2024-09-14 DIAGNOSIS — R07.9 CHEST PAIN, UNSPECIFIED TYPE: Primary | ICD-10-CM

## 2024-09-14 DIAGNOSIS — R91.8 RIGHT LOWER LOBE LUNG MASS: ICD-10-CM

## 2024-09-14 LAB
ALBUMIN SERPL-MCNC: 4.5 G/DL (ref 3.4–5)
ALBUMIN/GLOB SERPL: 1.3 {RATIO} (ref 1.1–2.2)
ALP SERPL-CCNC: 63 U/L (ref 40–129)
ALT SERPL-CCNC: 12 U/L (ref 10–40)
ANION GAP SERPL CALCULATED.3IONS-SCNC: 12 MMOL/L (ref 3–16)
AST SERPL-CCNC: 19 U/L (ref 15–37)
BASOPHILS # BLD: 0.1 K/UL (ref 0–0.2)
BASOPHILS NFR BLD: 0.8 %
BILIRUB SERPL-MCNC: <0.2 MG/DL (ref 0–1)
BUN SERPL-MCNC: 10 MG/DL (ref 7–20)
CALCIUM SERPL-MCNC: 9.1 MG/DL (ref 8.3–10.6)
CHLORIDE SERPL-SCNC: 103 MMOL/L (ref 99–110)
CO2 SERPL-SCNC: 26 MMOL/L (ref 21–32)
CREAT SERPL-MCNC: 0.6 MG/DL (ref 0.6–1.1)
DEPRECATED RDW RBC AUTO: 15.1 % (ref 12.4–15.4)
EOSINOPHIL # BLD: 0 K/UL (ref 0–0.6)
EOSINOPHIL NFR BLD: 0.5 %
GFR SERPLBLD CREATININE-BSD FMLA CKD-EPI: >90 ML/MIN/{1.73_M2}
GLUCOSE SERPL-MCNC: 101 MG/DL (ref 70–99)
HCG SERPL QL: NEGATIVE
HCT VFR BLD AUTO: 32.8 % (ref 36–48)
HGB BLD-MCNC: 10.6 G/DL (ref 12–16)
LYMPHOCYTES # BLD: 1.5 K/UL (ref 1–5.1)
LYMPHOCYTES NFR BLD: 19.2 %
MCH RBC QN AUTO: 25 PG (ref 26–34)
MCHC RBC AUTO-ENTMCNC: 32.2 G/DL (ref 31–36)
MCV RBC AUTO: 77.6 FL (ref 80–100)
MONOCYTES # BLD: 0.4 K/UL (ref 0–1.3)
MONOCYTES NFR BLD: 5.7 %
NEUTROPHILS # BLD: 5.7 K/UL (ref 1.7–7.7)
NEUTROPHILS NFR BLD: 73.8 %
PLATELET # BLD AUTO: 225 K/UL (ref 135–450)
PMV BLD AUTO: 9.5 FL (ref 5–10.5)
POTASSIUM SERPL-SCNC: 3.7 MMOL/L (ref 3.5–5.1)
PROT SERPL-MCNC: 8 G/DL (ref 6.4–8.2)
RBC # BLD AUTO: 4.23 M/UL (ref 4–5.2)
SODIUM SERPL-SCNC: 141 MMOL/L (ref 136–145)
TROPONIN, HIGH SENSITIVITY: <6 NG/L (ref 0–14)
TROPONIN, HIGH SENSITIVITY: <6 NG/L (ref 0–14)
WBC # BLD AUTO: 7.7 K/UL (ref 4–11)

## 2024-09-14 PROCEDURE — 96374 THER/PROPH/DIAG INJ IV PUSH: CPT

## 2024-09-14 PROCEDURE — 84703 CHORIONIC GONADOTROPIN ASSAY: CPT

## 2024-09-14 PROCEDURE — 99285 EMERGENCY DEPT VISIT HI MDM: CPT

## 2024-09-14 PROCEDURE — 6360000004 HC RX CONTRAST MEDICATION: Performed by: STUDENT IN AN ORGANIZED HEALTH CARE EDUCATION/TRAINING PROGRAM

## 2024-09-14 PROCEDURE — 96375 TX/PRO/DX INJ NEW DRUG ADDON: CPT

## 2024-09-14 PROCEDURE — 6360000002 HC RX W HCPCS: Performed by: STUDENT IN AN ORGANIZED HEALTH CARE EDUCATION/TRAINING PROGRAM

## 2024-09-14 PROCEDURE — 93005 ELECTROCARDIOGRAM TRACING: CPT | Performed by: STUDENT IN AN ORGANIZED HEALTH CARE EDUCATION/TRAINING PROGRAM

## 2024-09-14 PROCEDURE — 84484 ASSAY OF TROPONIN QUANT: CPT

## 2024-09-14 PROCEDURE — 36415 COLL VENOUS BLD VENIPUNCTURE: CPT

## 2024-09-14 PROCEDURE — 71046 X-RAY EXAM CHEST 2 VIEWS: CPT

## 2024-09-14 PROCEDURE — 80053 COMPREHEN METABOLIC PANEL: CPT

## 2024-09-14 PROCEDURE — 85025 COMPLETE CBC W/AUTO DIFF WBC: CPT

## 2024-09-14 PROCEDURE — 71260 CT THORAX DX C+: CPT

## 2024-09-14 RX ORDER — KETOROLAC TROMETHAMINE 15 MG/ML
15 INJECTION, SOLUTION INTRAMUSCULAR; INTRAVENOUS ONCE
Status: COMPLETED | OUTPATIENT
Start: 2024-09-14 | End: 2024-09-14

## 2024-09-14 RX ORDER — IOPAMIDOL 755 MG/ML
75 INJECTION, SOLUTION INTRAVASCULAR
Status: COMPLETED | OUTPATIENT
Start: 2024-09-14 | End: 2024-09-14

## 2024-09-14 RX ORDER — ONDANSETRON 2 MG/ML
4 INJECTION INTRAMUSCULAR; INTRAVENOUS ONCE
Status: COMPLETED | OUTPATIENT
Start: 2024-09-14 | End: 2024-09-14

## 2024-09-14 RX ADMIN — IOPAMIDOL 75 ML: 755 INJECTION, SOLUTION INTRAVENOUS at 16:44

## 2024-09-14 RX ADMIN — KETOROLAC TROMETHAMINE 15 MG: 15 INJECTION, SOLUTION INTRAMUSCULAR; INTRAVENOUS at 15:14

## 2024-09-14 RX ADMIN — ONDANSETRON 4 MG: 2 INJECTION INTRAMUSCULAR; INTRAVENOUS at 15:14

## 2024-09-14 ASSESSMENT — PAIN SCALES - GENERAL: PAINLEVEL_OUTOF10: 3

## 2024-09-14 ASSESSMENT — LIFESTYLE VARIABLES
HOW OFTEN DO YOU HAVE A DRINK CONTAINING ALCOHOL: NEVER
HOW MANY STANDARD DRINKS CONTAINING ALCOHOL DO YOU HAVE ON A TYPICAL DAY: PATIENT DOES NOT DRINK

## 2024-09-14 ASSESSMENT — PAIN DESCRIPTION - DESCRIPTORS: DESCRIPTORS: PRESSURE

## 2024-09-14 ASSESSMENT — PAIN - FUNCTIONAL ASSESSMENT: PAIN_FUNCTIONAL_ASSESSMENT: 0-10

## 2024-09-14 ASSESSMENT — PAIN DESCRIPTION - LOCATION: LOCATION: CHEST

## 2024-09-15 LAB
EKG ATRIAL RATE: 78 BPM
EKG DIAGNOSIS: NORMAL
EKG P AXIS: 80 DEGREES
EKG P-R INTERVAL: 150 MS
EKG Q-T INTERVAL: 386 MS
EKG QRS DURATION: 94 MS
EKG QTC CALCULATION (BAZETT): 440 MS
EKG R AXIS: 76 DEGREES
EKG T AXIS: 59 DEGREES
EKG VENTRICULAR RATE: 78 BPM

## 2024-09-15 PROCEDURE — 93010 ELECTROCARDIOGRAM REPORT: CPT | Performed by: INTERNAL MEDICINE

## 2024-09-16 ENCOUNTER — TELEPHONE (OUTPATIENT)
Dept: FAMILY MEDICINE CLINIC | Age: 39
End: 2024-09-16

## 2024-09-16 ENCOUNTER — TELEPHONE (OUTPATIENT)
Dept: PULMONOLOGY | Age: 39
End: 2024-09-16

## 2024-09-18 ENCOUNTER — TELEPHONE (OUTPATIENT)
Dept: PULMONOLOGY | Age: 39
End: 2024-09-18

## 2024-09-18 NOTE — TELEPHONE ENCOUNTER
Ave from  Dr. Robyn Nguyễn's office called to see if they could get patient in sooner than 9/30. It is a new patient hospital follow up. Please call and let them know and also call patient.

## 2024-09-25 ENCOUNTER — PREP FOR PROCEDURE (OUTPATIENT)
Dept: PULMONOLOGY | Age: 39
End: 2024-09-25

## 2024-09-25 ENCOUNTER — OFFICE VISIT (OUTPATIENT)
Dept: PULMONOLOGY | Age: 39
End: 2024-09-25
Payer: COMMERCIAL

## 2024-09-25 VITALS
WEIGHT: 138.4 LBS | OXYGEN SATURATION: 99 % | BODY MASS INDEX: 22.24 KG/M2 | HEIGHT: 66 IN | DIASTOLIC BLOOD PRESSURE: 70 MMHG | RESPIRATION RATE: 17 BRPM | SYSTOLIC BLOOD PRESSURE: 108 MMHG | HEART RATE: 82 BPM

## 2024-09-25 DIAGNOSIS — R91.8 LUNG MASS: Primary | ICD-10-CM

## 2024-09-25 DIAGNOSIS — R91.1 PULMONARY NODULE: ICD-10-CM

## 2024-09-25 PROCEDURE — G8427 DOCREV CUR MEDS BY ELIG CLIN: HCPCS | Performed by: INTERNAL MEDICINE

## 2024-09-25 PROCEDURE — 99204 OFFICE O/P NEW MOD 45 MIN: CPT | Performed by: INTERNAL MEDICINE

## 2024-09-25 PROCEDURE — G8420 CALC BMI NORM PARAMETERS: HCPCS | Performed by: INTERNAL MEDICINE

## 2024-09-25 PROCEDURE — 1036F TOBACCO NON-USER: CPT | Performed by: INTERNAL MEDICINE

## 2024-09-25 RX ORDER — DIAZEPAM 2 MG
2 TABLET ORAL ONCE
Qty: 1 TABLET | Refills: 0 | Status: SHIPPED | OUTPATIENT
Start: 2024-09-25 | End: 2024-09-25

## 2024-09-26 ENCOUNTER — ANESTHESIA EVENT (OUTPATIENT)
Dept: ENDOSCOPY | Age: 39
End: 2024-09-26
Payer: COMMERCIAL

## 2024-09-26 RX ORDER — SODIUM CHLORIDE 9 MG/ML
INJECTION, SOLUTION INTRAVENOUS PRN
Status: CANCELLED | OUTPATIENT
Start: 2024-09-26

## 2024-09-26 RX ORDER — SODIUM CHLORIDE 9 MG/ML
INJECTION, SOLUTION INTRAVENOUS CONTINUOUS
Status: CANCELLED | OUTPATIENT
Start: 2024-09-26

## 2024-09-26 RX ORDER — SODIUM CHLORIDE 0.9 % (FLUSH) 0.9 %
5-40 SYRINGE (ML) INJECTION PRN
Status: CANCELLED | OUTPATIENT
Start: 2024-09-26

## 2024-09-26 RX ORDER — SODIUM CHLORIDE 0.9 % (FLUSH) 0.9 %
5-40 SYRINGE (ML) INJECTION EVERY 12 HOURS SCHEDULED
Status: CANCELLED | OUTPATIENT
Start: 2024-09-26

## 2024-09-27 ENCOUNTER — APPOINTMENT (OUTPATIENT)
Dept: GENERAL RADIOLOGY | Age: 39
End: 2024-09-27
Attending: INTERNAL MEDICINE
Payer: COMMERCIAL

## 2024-09-27 ENCOUNTER — ANESTHESIA (OUTPATIENT)
Dept: ENDOSCOPY | Age: 39
End: 2024-09-27
Payer: COMMERCIAL

## 2024-09-27 ENCOUNTER — HOSPITAL ENCOUNTER (OUTPATIENT)
Age: 39
Setting detail: OUTPATIENT SURGERY
Discharge: HOME | End: 2024-09-27
Attending: INTERNAL MEDICINE | Admitting: INTERNAL MEDICINE
Payer: COMMERCIAL

## 2024-09-27 VITALS
DIASTOLIC BLOOD PRESSURE: 53 MMHG | HEIGHT: 66 IN | BODY MASS INDEX: 22.18 KG/M2 | RESPIRATION RATE: 16 BRPM | WEIGHT: 138 LBS | TEMPERATURE: 97.4 F | SYSTOLIC BLOOD PRESSURE: 130 MMHG | HEART RATE: 107 BPM | OXYGEN SATURATION: 96 %

## 2024-09-27 LAB
APPEARANCE BRONCH: ABNORMAL
APPEARANCE BRONCH: ABNORMAL
CLOT SPEC QL: ABNORMAL
CLOT SPEC QL: ABNORMAL
COLOR BRONCH: ABNORMAL
COLOR BRONCH: ABNORMAL
EOSINOPHIL NFR BRONCH MANUAL: 1 %
EOSINOPHIL NFR BRONCH MANUAL: 1 %
HCG UR QL: NEGATIVE
LYMPHOCYTES NFR BRONCH MANUAL: 13 % (ref 5–10)
LYMPHOCYTES NFR BRONCH MANUAL: 15 % (ref 5–10)
MACROPHAGES NFR BRONCH MANUAL: 12 % (ref 90–95)
MACROPHAGES NFR BRONCH MANUAL: 30 % (ref 90–95)
MONOCYTES NFR BRONCH MANUAL: 14 %
MONOCYTES NFR BRONCH MANUAL: 32 %
NEUTROPHILS NFR BRONCH MANUAL: 22 % (ref 5–10)
NEUTROPHILS NFR BRONCH MANUAL: 60 % (ref 5–10)
NUC CELL # BRONCH MANUAL: 51 /CUMM
NUC CELL # BRONCH MANUAL: 59 /CUMM
RBC # FLD MANUAL: 1900 /CUMM
RBC # FLD MANUAL: ABNORMAL /CUMM
TOTAL CELLS COUNTED BRONCH: 100
TOTAL CELLS COUNTED BRONCH: 100

## 2024-09-27 PROCEDURE — 3609011900 HC BRONCHOSCOPY NEEDLE BX TRACHEA MAIN STEM&/BRON: Performed by: INTERNAL MEDICINE

## 2024-09-27 PROCEDURE — 88112 CYTOPATH CELL ENHANCE TECH: CPT

## 2024-09-27 PROCEDURE — 3603165200 HC BRNCHSC EBUS GUIDED SAMPL 1/2 NODE STATION/STRUX: Performed by: INTERNAL MEDICINE

## 2024-09-27 PROCEDURE — 2709999900 HC NON-CHARGEABLE SUPPLY: Performed by: INTERNAL MEDICINE

## 2024-09-27 PROCEDURE — 88172 CYTP DX EVAL FNA 1ST EA SITE: CPT

## 2024-09-27 PROCEDURE — 89051 BODY FLUID CELL COUNT: CPT

## 2024-09-27 PROCEDURE — 88312 SPECIAL STAINS GROUP 1: CPT

## 2024-09-27 PROCEDURE — 88177 CYTP FNA EVAL EA ADDL: CPT

## 2024-09-27 PROCEDURE — 2500000003 HC RX 250 WO HCPCS: Performed by: ANESTHESIOLOGY

## 2024-09-27 PROCEDURE — 2500000003 HC RX 250 WO HCPCS: Performed by: NURSE ANESTHETIST, CERTIFIED REGISTERED

## 2024-09-27 PROCEDURE — 31652 BRONCH EBUS SAMPLNG 1/2 NODE: CPT | Performed by: INTERNAL MEDICINE

## 2024-09-27 PROCEDURE — 6360000002 HC RX W HCPCS: Performed by: NURSE ANESTHETIST, CERTIFIED REGISTERED

## 2024-09-27 PROCEDURE — 2580000003 HC RX 258: Performed by: ANESTHESIOLOGY

## 2024-09-27 PROCEDURE — 84703 CHORIONIC GONADOTROPIN ASSAY: CPT

## 2024-09-27 PROCEDURE — 87305 ASPERGILLUS AG IA: CPT

## 2024-09-27 PROCEDURE — 2720000010 HC SURG SUPPLY STERILE: Performed by: INTERNAL MEDICINE

## 2024-09-27 PROCEDURE — 87116 MYCOBACTERIA CULTURE: CPT

## 2024-09-27 PROCEDURE — 7100000001 HC PACU RECOVERY - ADDTL 15 MIN: Performed by: INTERNAL MEDICINE

## 2024-09-27 PROCEDURE — 88185 FLOWCYTOMETRY/TC ADD-ON: CPT

## 2024-09-27 PROCEDURE — 87102 FUNGUS ISOLATION CULTURE: CPT

## 2024-09-27 PROCEDURE — 3609010800 HC BRONCHOSCOPY ALVEOLAR LAVAGE: Performed by: INTERNAL MEDICINE

## 2024-09-27 PROCEDURE — 87070 CULTURE OTHR SPECIMN AEROBIC: CPT

## 2024-09-27 PROCEDURE — 7100000000 HC PACU RECOVERY - FIRST 15 MIN: Performed by: INTERNAL MEDICINE

## 2024-09-27 PROCEDURE — 3700000000 HC ANESTHESIA ATTENDED CARE: Performed by: INTERNAL MEDICINE

## 2024-09-27 PROCEDURE — 3700000001 HC ADD 15 MINUTES (ANESTHESIA): Performed by: INTERNAL MEDICINE

## 2024-09-27 PROCEDURE — 87205 SMEAR GRAM STAIN: CPT

## 2024-09-27 PROCEDURE — 87206 SMEAR FLUORESCENT/ACID STAI: CPT

## 2024-09-27 PROCEDURE — 88184 FLOWCYTOMETRY/ TC 1 MARKER: CPT

## 2024-09-27 PROCEDURE — 88305 TISSUE EXAM BY PATHOLOGIST: CPT

## 2024-09-27 PROCEDURE — 7100000011 HC PHASE II RECOVERY - ADDTL 15 MIN: Performed by: INTERNAL MEDICINE

## 2024-09-27 PROCEDURE — 31624 DX BRONCHOSCOPE/LAVAGE: CPT | Performed by: INTERNAL MEDICINE

## 2024-09-27 PROCEDURE — 7100000010 HC PHASE II RECOVERY - FIRST 15 MIN: Performed by: INTERNAL MEDICINE

## 2024-09-27 PROCEDURE — 88173 CYTOPATH EVAL FNA REPORT: CPT

## 2024-09-27 PROCEDURE — 87449 NOS EACH ORGANISM AG IA: CPT

## 2024-09-27 PROCEDURE — 71045 X-RAY EXAM CHEST 1 VIEW: CPT

## 2024-09-27 RX ORDER — DEXAMETHASONE SODIUM PHOSPHATE 4 MG/ML
INJECTION, SOLUTION INTRA-ARTICULAR; INTRALESIONAL; INTRAMUSCULAR; INTRAVENOUS; SOFT TISSUE
Status: DISCONTINUED | OUTPATIENT
Start: 2024-09-27 | End: 2024-09-27 | Stop reason: SDUPTHER

## 2024-09-27 RX ORDER — LABETALOL HYDROCHLORIDE 5 MG/ML
5 INJECTION, SOLUTION INTRAVENOUS EVERY 10 MIN PRN
Status: DISCONTINUED | OUTPATIENT
Start: 2024-09-27 | End: 2024-09-27 | Stop reason: HOSPADM

## 2024-09-27 RX ORDER — PROPOFOL 10 MG/ML
INJECTION, EMULSION INTRAVENOUS
Status: DISCONTINUED | OUTPATIENT
Start: 2024-09-27 | End: 2024-09-27 | Stop reason: SDUPTHER

## 2024-09-27 RX ORDER — SODIUM CHLORIDE 9 MG/ML
INJECTION, SOLUTION INTRAVENOUS CONTINUOUS
Status: DISCONTINUED | OUTPATIENT
Start: 2024-09-27 | End: 2024-09-27 | Stop reason: HOSPADM

## 2024-09-27 RX ORDER — SODIUM CHLORIDE 9 MG/ML
INJECTION, SOLUTION INTRAVENOUS PRN
Status: DISCONTINUED | OUTPATIENT
Start: 2024-09-27 | End: 2024-09-27 | Stop reason: HOSPADM

## 2024-09-27 RX ORDER — SODIUM CHLORIDE 0.9 % (FLUSH) 0.9 %
5-40 SYRINGE (ML) INJECTION PRN
Status: DISCONTINUED | OUTPATIENT
Start: 2024-09-27 | End: 2024-09-27 | Stop reason: HOSPADM

## 2024-09-27 RX ORDER — ROCURONIUM BROMIDE 10 MG/ML
INJECTION, SOLUTION INTRAVENOUS
Status: DISCONTINUED | OUTPATIENT
Start: 2024-09-27 | End: 2024-09-27 | Stop reason: SDUPTHER

## 2024-09-27 RX ORDER — SODIUM CHLORIDE, SODIUM LACTATE, POTASSIUM CHLORIDE, CALCIUM CHLORIDE 600; 310; 30; 20 MG/100ML; MG/100ML; MG/100ML; MG/100ML
INJECTION, SOLUTION INTRAVENOUS CONTINUOUS
Status: DISCONTINUED | OUTPATIENT
Start: 2024-09-27 | End: 2024-09-27 | Stop reason: HOSPADM

## 2024-09-27 RX ORDER — NALOXONE HYDROCHLORIDE 0.4 MG/ML
INJECTION, SOLUTION INTRAMUSCULAR; INTRAVENOUS; SUBCUTANEOUS PRN
Status: DISCONTINUED | OUTPATIENT
Start: 2024-09-27 | End: 2024-09-27 | Stop reason: HOSPADM

## 2024-09-27 RX ORDER — DIPHENHYDRAMINE HYDROCHLORIDE 50 MG/ML
12.5 INJECTION INTRAMUSCULAR; INTRAVENOUS
Status: DISCONTINUED | OUTPATIENT
Start: 2024-09-27 | End: 2024-09-27 | Stop reason: HOSPADM

## 2024-09-27 RX ORDER — SODIUM CHLORIDE 0.9 % (FLUSH) 0.9 %
5-40 SYRINGE (ML) INJECTION EVERY 12 HOURS SCHEDULED
Status: DISCONTINUED | OUTPATIENT
Start: 2024-09-27 | End: 2024-09-27 | Stop reason: HOSPADM

## 2024-09-27 RX ORDER — LIDOCAINE HYDROCHLORIDE 20 MG/ML
INJECTION, SOLUTION INFILTRATION; PERINEURAL
Status: DISCONTINUED | OUTPATIENT
Start: 2024-09-27 | End: 2024-09-27 | Stop reason: SDUPTHER

## 2024-09-27 RX ORDER — ONDANSETRON 2 MG/ML
4 INJECTION INTRAMUSCULAR; INTRAVENOUS
Status: DISCONTINUED | OUTPATIENT
Start: 2024-09-27 | End: 2024-09-27 | Stop reason: HOSPADM

## 2024-09-27 RX ORDER — MIDAZOLAM HYDROCHLORIDE 1 MG/ML
INJECTION INTRAMUSCULAR; INTRAVENOUS
Status: DISCONTINUED | OUTPATIENT
Start: 2024-09-27 | End: 2024-09-27 | Stop reason: SDUPTHER

## 2024-09-27 RX ORDER — OXYCODONE HYDROCHLORIDE 5 MG/1
10 TABLET ORAL PRN
Status: DISCONTINUED | OUTPATIENT
Start: 2024-09-27 | End: 2024-09-27 | Stop reason: HOSPADM

## 2024-09-27 RX ORDER — OXYCODONE HYDROCHLORIDE 5 MG/1
5 TABLET ORAL PRN
Status: DISCONTINUED | OUTPATIENT
Start: 2024-09-27 | End: 2024-09-27 | Stop reason: HOSPADM

## 2024-09-27 RX ORDER — ONDANSETRON 2 MG/ML
INJECTION INTRAMUSCULAR; INTRAVENOUS
Status: DISCONTINUED | OUTPATIENT
Start: 2024-09-27 | End: 2024-09-27 | Stop reason: SDUPTHER

## 2024-09-27 RX ORDER — MEPERIDINE HYDROCHLORIDE 25 MG/ML
12.5 INJECTION INTRAMUSCULAR; INTRAVENOUS; SUBCUTANEOUS EVERY 5 MIN PRN
Status: DISCONTINUED | OUTPATIENT
Start: 2024-09-27 | End: 2024-09-27 | Stop reason: HOSPADM

## 2024-09-27 RX ADMIN — ROCURONIUM BROMIDE 50 MG: 10 INJECTION INTRAVENOUS at 13:19

## 2024-09-27 RX ADMIN — PROPOFOL 200 MG: 10 INJECTION, EMULSION INTRAVENOUS at 13:19

## 2024-09-27 RX ADMIN — SODIUM CHLORIDE, POTASSIUM CHLORIDE, SODIUM LACTATE AND CALCIUM CHLORIDE: 600; 310; 30; 20 INJECTION, SOLUTION INTRAVENOUS at 13:01

## 2024-09-27 RX ADMIN — LIDOCAINE HYDROCHLORIDE 100 MG: 20 INJECTION, SOLUTION INFILTRATION; PERINEURAL at 13:19

## 2024-09-27 RX ADMIN — Medication 20 MG: at 12:05

## 2024-09-27 RX ADMIN — ONDANSETRON 8 MG: 2 INJECTION INTRAMUSCULAR; INTRAVENOUS at 13:30

## 2024-09-27 RX ADMIN — MIDAZOLAM 2 MG: 1 INJECTION INTRAMUSCULAR; INTRAVENOUS at 13:01

## 2024-09-27 RX ADMIN — DEXAMETHASONE SODIUM PHOSPHATE 8 MG: 4 INJECTION INTRA-ARTICULAR; INTRALESIONAL; INTRAMUSCULAR; INTRAVENOUS; SOFT TISSUE at 13:30

## 2024-09-27 ASSESSMENT — PAIN SCALES - GENERAL
PAINLEVEL_OUTOF10: 2
PAINLEVEL_OUTOF10: 0

## 2024-09-27 ASSESSMENT — PAIN - FUNCTIONAL ASSESSMENT: PAIN_FUNCTIONAL_ASSESSMENT: NONE - DENIES PAIN

## 2024-09-27 NOTE — DISCHARGE INSTRUCTIONS
Please follow up with pulmonologist as needed.   The pulmonology office will call regarding any biopsy result.   Please call the office with any additional questions, [phone]           Bronchoscopy: What to Expect at Home  Your Recovery     Bronchoscopy lets your doctor look at your airway through a tube called a bronchoscope. Afterward, you may feel tired for 1 or 2 days. Your mouth may feel very dry for several hours after the procedure. You may also have a sore throat and a hoarse voice for a few days. Sucking on throat lozenges or gargling with warm salt water may help soothe your sore throat.  If a sample of tissue (biopsy) was taken, you may spit up a small amount of blood or have bloody saliva. This is normal.  Do not drive or smoke for 24 hours.  This care sheet gives you a general idea about how long it will take for you to recover. But each person recovers at a different pace. Follow the steps below to get better as quickly as possible.  How can you care for yourself at home?  Activity    Do not eat anything for 2 hours after the procedure.     Rest when you feel tired. Getting enough sleep will help you recover.     Avoid strenuous activities, such as bicycle riding, jogging, weight lifting, or aerobic exercise, until your doctor says it is okay.     No driving for 24hrs.    Diet    You can eat your normal diet. If your stomach is upset, try bland, low-fat foods like plain rice, broiled chicken, toast, and yogurt.     If it is painful to swallow, start out with cold drinks, flavored ice pops, and ice cream. Next, try soft foods like pudding, yogurt, canned or cooked fruit, scrambled eggs, and mashed potatoes. Avoid eating hard or scratchy foods like chips or raw vegetables. Avoid orange or tomato juice and other acidic foods that can sting the throat.     Drink plenty of fluids to avoid becoming dehydrated (unless your doctor tells you not to).   Medicines    Take pain medicines exactly as  follow these instructions.  \"           Have someone responsible help you with your care.  \"           Do not drive for 24 hours.  \"           Do not operate equipment for 24 hours (lawnmowers, power tools, kitchen accessories, stove).  \"           Do not drink any alcoholic beverages for a minimum of 24 hours.  \"           Do not make important personal, legal or business decisions for 24 hours.  \"           You may experience dizziness or lightheadedness. Move slowly and carefully, do not make sudden position changes.  \"           Drink extra amounts of fluids today.  \"           Increase your diet as tolerated (unless you have received specific instructions from your doctor).  \"           If you feel nauseated, continue with liquids until the nausea is gone.  \"           Notify your physician if you have not urinated within 8 hours after the procedure.  \"           Resume your medications unless otherwise instructed.     Contact your physician if you have any questions or concerns.     IF YOU REPORT TO AN EMERGENCY ROOM, DOCTOR'S OFFICE OR HOSPITAL WITHIN 24 HOURS AFTER YOUR PROCEDURE, BRING THIS SHEET AND YOUR AFTER VISIT SUMMARY WITH YOU AND GIVE IT TO THE PHYSICIAN OR NURSE ATTENDING YOU.    When should you call for help?   Call 911 anytime you think you may need emergency care. For example, call if:    You passed out (lost consciousness).     You have sudden chest pain and shortness of breath.     You cough up large amounts of bright red blood.     You have severe pain in your chest.     You have severe trouble breathing.   Call your doctor now or seek immediate medical care if:    You cough up more than a few tablespoons of blood.     You have pain that does not get better after you take pain medicine.     You have a fever over 100°F.     You still sound hoarse after a few days.     You have bubbles under the skin around the collarbone. These may crackle and pop when you press on them.   Watch closely for

## 2024-09-27 NOTE — PROGRESS NOTES
Chest xray result normal.  Went over patient discharge paperwork.   IV taken out without any evidence of phlebitis or infiltration.   Patient taken off unit in wheelchair by RN.

## 2024-09-27 NOTE — PROGRESS NOTES
Dr. Potts talked to patient on phone, RN at bedside. Awaiting chest xray results to be read, then patient can be discharged.

## 2024-09-28 LAB
LOEFFLER MB STN SPEC: NORMAL
LOEFFLER MB STN SPEC: NORMAL

## 2024-09-29 LAB
ACID FAST STN SPEC QL: NORMAL
ACID FAST STN SPEC QL: NORMAL
BACTERIA SPEC RESP CULT: NORMAL
BACTERIA SPEC RESP CULT: NORMAL
GRAM STN SPEC: NORMAL
GRAM STN SPEC: NORMAL

## 2024-09-29 NOTE — OP NOTE
Operative Note      Patient: Caitlin Penny  YOB: 1985  MRN: [id number]    Date of Procedure: 9/27/2024    Pre-Op Diagnosis Codes:      * Lung mass [R91.8]     * Pulmonary nodule [R91.1]    Post-Op Diagnosis: Same       Procedure(s):  BRONCHOSCOPY/TRANSBRONCHIAL NEEDLE BIOPSY  BRONCHOSCOPY ENDOBRONCHIAL ULTRASOUND  BRONCHOSCOPY ALVEOLAR LAVAGE    Surgeon(s):  Aishwarya De Santiago MD    Assistant:   * No surgical staff found *    Anesthesia: General    Estimated Blood Loss (mL): Minimal    Complications: None    Specimens:   * No specimens in log *    Implants:  * No implants in log *      Drains: * No LDAs found *    Findings:      INDICATIONS & HISTORY:  Lung mass    PREOPERATIVE DIAGNOSIS:    Adenopathy     POSTOPERATIVE DIAGNOSES:  Same       PROCEDURE PERFORMED:   1-Diagnotic, Fiberoptic Bronchoscopy  2-Bronchial alveolar lavage from RLL  3-EBUS -FNA right hilar mass               SURGEON:    Aishwarya De Santiago     ASSISTANT:   Bronchoscopy Nursing/Technical Team/OR Team as available i    SEDATION:     Regional Anesthesia,       ANESTHESIA:    Lidocaine 2% ,       ANESTHESIOLOGIST:  Per EPIC Note    SPECIMENS:  [x] Bronchial sample(s) for      Fungal smear & culture,   Acid-fast bacillus Smear and Culture,    Gram stain, C&S,    PCP               Cytology               BD glucan              Galactomanin      Description of Procedure:     The patient  identified Caitlin Penny  and the procedure verified as Flexible Fiberoptic Bronchoscopy with BAL        After the patient was controlled with sedation bronchoscope was introduced through  without difficulty. The scope was then passed into the trachea.  Additional 2% lidocaine was used topically within the airway.  Careful inspection of the tracheal lumen was accomplished. The scope was sequentially passed into all bronchial airways.           Endobronchial findings:   Vocal cord intact   Trachea:  Normal mucosa, he the part seen outside the ET tube  Radha

## 2024-09-29 NOTE — H&P
by Aishwarya De Santiago MD at [facility] ENDOSCOPY    BRONCHOSCOPY  9/27/2024    BRONCHOSCOPY ALVEOLAR LAVAGE performed by Aishwarya De Santiago MD at [facility] ENDOSCOPY    DILATION AND CURETTAGE OF UTERUS      DILATION AND CURETTAGE OF UTERUS  04/13/2012    DILATATION AND CURETTAGE, HYSTEROSCOPY    DILATION AND CURETTAGE OF UTERUS  11/21/2017    NASAL SEPTUM SURGERY         FAMILY HISTORY:   Lung cancer:no  DVT or PE no    REVIEW OF SYSTEMS:  Constitutional: General health is good . There has been no weight changes. No fevers, fatigue or weakness.   Head: Patient denies any history of trauma, convulsive disorder or syncope.    Skin:  Patient denies history of changes in pigmentation, eruptions or pruritus.  No easy bruising or bleeding.  EENT: no nasal congestion   Cardiovascular ,No chest pain ,No edema ,  Respiration:SOB:no  ,KEITA : no   Gastrointestinal:No GI bleed ,no melena  ,no hematemesis    Musculoskeletal: no joint pain ,no swelling  Neurological:no , syncope.  Denies twitching, convulsions, loss of consciousness or memory.     Endocrine:  . No history of goiter, exophthalmos or dryness of skin.  The patient has no history of diabetes.    Hematopoietic:  No history of bleeding disorders or easy bruising.  Rheumatic:  No connective tissue disease history or polyarthritis/inflammatory joint disease.      PHYSICAL EXAMINATION:  Vitals:    09/27/24 1445   BP:    Pulse:    Resp:    Temp: 97.4 °F (36.3 °C)   SpO2:      Constitutional: This is a well developed, well nourished 39 y.o. year old female who is alert, oriented, cooperative and in no apparent distress.  Head was normocephalic and atraumatic.    EENT: Mallampati class :  Extraocular muscles intact.   External canals are patent and no discharge was appreciated.  Septum was midline,   mucosa was without erythema, exudates or cobblestoning.  No thrush was noted.      Neck: Supple without thyromegaly. No elevated JVP. Trachea was midline. No carotid bruits were

## 2024-09-30 ENCOUNTER — TELEPHONE (OUTPATIENT)
Dept: PULMONOLOGY | Age: 39
End: 2024-09-30

## 2024-09-30 NOTE — TELEPHONE ENCOUNTER
[facility] Fax a request for CXR , PET scan and chart notes for patient.

## 2024-10-03 ENCOUNTER — TELEPHONE (OUTPATIENT)
Dept: PULMONOLOGY | Age: 39
End: 2024-10-03

## 2024-10-03 LAB — MISCELLANEOUS LAB TEST ORDER: ABNORMAL

## 2024-10-03 NOTE — TELEPHONE ENCOUNTER
Patient called in wanting to check on her cultures from the lung mass. Informed that they are not all back, pt was concerned that it is taking too long. Assured her that this is normal. Asked pt to call us back on Monday or Tuesday if she did not hear from us by then. Pt verbalized understanding. Pt informed me that Dr De Santiago told her mother that she did not need to do the PET scan for tomorrow, since the mass did not look malignant. I told the pt to cancel it and if we need to reschedule then we will. Pt verbalized understanding

## 2024-10-07 LAB
FUNGUS SPEC CULT: NORMAL
FUNGUS SPEC CULT: NORMAL
LOEFFLER MB STN SPEC: NORMAL
LOEFFLER MB STN SPEC: NORMAL

## 2024-10-08 LAB
ACID FAST STN SPEC QL: NORMAL
ACID FAST STN SPEC QL: NORMAL
MYCOBACTERIUM SPEC CULT: NORMAL
MYCOBACTERIUM SPEC CULT: NORMAL

## 2024-11-02 DIAGNOSIS — B44.9 ASPERGILLOMA (HCC): Primary | ICD-10-CM

## 2024-11-15 NOTE — PROGRESS NOTES
3 times daily as needed for Nausea or Vomiting (Patient not taking: Reported on 2/7/2024) 6 tablet 0    silver sulfADIAZINE (SILVADENE) 1 % cream Apply topically daily. (Patient not taking: Reported on 2/7/2024) 25 g 0     No current facility-administered medications for this visit.       Vitals:    07/31/24 1500   BP: 118/70   Pulse: 70   SpO2: 98%   Weight: 64.9 kg (143 lb)     Estimated body mass index is 23.8 kg/m² as calculated from the following:    Height as of 2/7/24: 1.651 m (5' 5\").    Weight as of this encounter: 64.9 kg (143 lb).    Physical Exam  Vitals reviewed.   Constitutional:       Appearance: Normal appearance. She is normal weight.   HENT:      Head: Normocephalic and atraumatic.        Comments: <2mm cyst like area behind right ear. Non-tender, mobile.      Right Ear: Tympanic membrane normal.      Left Ear: Tympanic membrane normal.      Nose: Nose normal.      Mouth/Throat:      Mouth: Mucous membranes are dry.      Pharynx: Oropharynx is clear. No oropharyngeal exudate or posterior oropharyngeal erythema.   Eyes:      Conjunctiva/sclera: Conjunctivae normal.   Cardiovascular:      Rate and Rhythm: Normal rate and regular rhythm.      Pulses: Normal pulses.      Heart sounds: Normal heart sounds.   Pulmonary:      Effort: Pulmonary effort is normal.      Breath sounds: Normal breath sounds.   Musculoskeletal:         General: Normal range of motion.      Cervical back: Normal range of motion and neck supple.   Skin:     General: Skin is warm and dry.      Capillary Refill: Capillary refill takes less than 2 seconds.   Neurological:      General: No focal deficit present.      Mental Status: She is alert and oriented to person, place, and time. Mental status is at baseline.   Psychiatric:         Mood and Affect: Mood normal.         Behavior: Behavior normal.         Thought Content: Thought content normal.         Judgment: Judgment normal.        39 cm 4Fr  PICC inserted via the basilic vein.  Sterile dressing applied.  Tip location SVC/ RA Junction. This PICC was placed for TPN administration and should only be used for such.

## 2024-11-19 ENCOUNTER — TELEPHONE (OUTPATIENT)
Dept: PULMONOLOGY | Age: 39
End: 2024-11-19

## 2024-11-19 ENCOUNTER — OFFICE VISIT (OUTPATIENT)
Dept: INFECTIOUS DISEASES | Age: 39
End: 2024-11-19
Payer: COMMERCIAL

## 2024-11-19 VITALS
BODY MASS INDEX: 21.38 KG/M2 | SYSTOLIC BLOOD PRESSURE: 110 MMHG | HEART RATE: 68 BPM | OXYGEN SATURATION: 98 % | TEMPERATURE: 97.8 F | DIASTOLIC BLOOD PRESSURE: 66 MMHG | HEIGHT: 66 IN | WEIGHT: 133 LBS

## 2024-11-19 DIAGNOSIS — R91.1 LUNG NODULE: Primary | ICD-10-CM

## 2024-11-19 DIAGNOSIS — R91.8 LUNG MASS: Primary | ICD-10-CM

## 2024-11-19 PROCEDURE — G8420 CALC BMI NORM PARAMETERS: HCPCS | Performed by: INTERNAL MEDICINE

## 2024-11-19 PROCEDURE — 99204 OFFICE O/P NEW MOD 45 MIN: CPT | Performed by: INTERNAL MEDICINE

## 2024-11-19 PROCEDURE — 1036F TOBACCO NON-USER: CPT | Performed by: INTERNAL MEDICINE

## 2024-11-19 PROCEDURE — G8427 DOCREV CUR MEDS BY ELIG CLIN: HCPCS | Performed by: INTERNAL MEDICINE

## 2024-11-19 PROCEDURE — G8484 FLU IMMUNIZE NO ADMIN: HCPCS | Performed by: INTERNAL MEDICINE

## 2024-11-19 NOTE — TELEPHONE ENCOUNTER
Pt stated she had a biopsy done and Dr. Gaviria told her to go to ID.   Pt stated that she seen ID today and they stated that there is not enough evidence to treat her. Pt is wondering if Dr. Leonard will prescribe her something she still feels sob and congested in her chest still. Please advise

## 2024-11-19 NOTE — PROGRESS NOTES
Infectious Diseases   Consult Note      Reason for Consult:  concern for pulmonary aspergillosis    Requesting Physician:  Jet Donovan         Subjective:   CHIEF COMPLAINT:  none given       HPI:    Caitlin Penny is a 39yoF with limited medical history                 Saw PCP 7/31/24 re tender post- R auricular tiny mass  About 1 week later, early August - malaise, flu-like, productive cough   Daughter had COVID; patient's home COVID test was negative   Cough lingered   PCP visit 8/23/24 re persistent LN - Rx Augmentin  No change in the mass, though sometimes her glasses hurt   US of the area suggested non-pathologically enlarged LN     On 9/14/24 - seen in ED for midsternal chest pain/tightness present for ~3d.  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   She was referred for evaluation     Today, doesn't feel ill or sick, but still doesn't feel quite like herself  Gets SOB w lesser activity   No cough, no SOB   No F/C/NS, loss of appetite, weight loss        Past Surgical History:       Diagnosis Date    Anemia     Endometrial polyp 4/2011    Gastric reflux     during pregnancy    Infertility, female     iui for pregnancy    Menorrhagia     [place] teeth extracted          Procedure Laterality Date    BRONCHOSCOPY  09/27/2024    BRONCHOSCOPY N/A 9/27/2024    BRONCHOSCOPY/TRANSBRONCHIAL NEEDLE BIOPSY performed by Aishwarya De Santiago MD at [facility] ENDOSCOPY    BRONCHOSCOPY N/A 9/27/2024    BRONCHOSCOPY ENDOBRONCHIAL ULTRASOUND performed by Aishwarya De Santiago MD at [facility] ENDOSCOPY    BRONCHOSCOPY  9/27/2024    BRONCHOSCOPY ALVEOLAR LAVAGE performed by Aishwarya De Santiago MD at [facility] ENDOSCOPY    DILATION AND

## 2024-12-11 ENCOUNTER — HOSPITAL ENCOUNTER (OUTPATIENT)
Dept: CT IMAGING | Age: 39
Discharge: HOME | End: 2024-12-11
Attending: INTERNAL MEDICINE
Payer: COMMERCIAL

## 2024-12-11 ENCOUNTER — HOSPITAL ENCOUNTER (OUTPATIENT)
Dept: PULMONOLOGY | Age: 39
Discharge: HOME | End: 2024-12-11
Attending: INTERNAL MEDICINE
Payer: COMMERCIAL

## 2024-12-11 DIAGNOSIS — R91.1 LUNG NODULE: ICD-10-CM

## 2024-12-11 DIAGNOSIS — R91.8 LUNG MASS: ICD-10-CM

## 2024-12-11 LAB
DLCO %PRED: 62 %
DLCO PRED: NORMAL
DLCO/VA %PRED: NORMAL
DLCO/VA PRED: NORMAL
DLCO/VA: NORMAL
DLCO: NORMAL
EXPIRATORY TIME-POST: NORMAL
EXPIRATORY TIME: NORMAL
FEF 25-75 %CHNG: NORMAL
FEF 25-75 POST %PRED: NORMAL
FEF 25-75% %PRED-PRE: NORMAL
FEF 25-75% PRED: NORMAL
FEF 25-75-POST: NORMAL
FEF 25-75-PRE: NORMAL
FEV1 %PRED-POST: NORMAL
FEV1 %PRED-PRE: 89 %
FEV1 PRED: NORMAL
FEV1-POST: NORMAL
FEV1-PRE: NORMAL
FEV1/FVC %PRED-POST: NORMAL
FEV1/FVC %PRED-PRE: NORMAL
FEV1/FVC PRED: NORMAL
FEV1/FVC-POST: NORMAL
FEV1/FVC-PRE: 0.84 %
FVC %PRED-POST: NORMAL
FVC %PRED-PRE: NORMAL
FVC PRED: NORMAL
FVC-POST: NORMAL
FVC-PRE: NORMAL
GAW %PRED: NORMAL
GAW PRED: NORMAL
GAW: NORMAL
IC PRE %PRED: NORMAL
IC PRED: NORMAL
IC: NORMAL
MEP: NORMAL
MIP: NORMAL
MVV %PRED-PRE: NORMAL
MVV PRED: NORMAL
MVV-PRE: NORMAL
PEF %PRED-POST: NORMAL
PEF %PRED-PRE: NORMAL
PEF PRED: NORMAL
PEF%CHNG: NORMAL
PEF-POST: NORMAL
PEF-PRE: NORMAL
RAW %PRED: NORMAL
RAW PRED: NORMAL
RAW: NORMAL
RV PRE %PRED: NORMAL
RV PRED: NORMAL
RV: NORMAL
SVC %PRED: NORMAL
SVC PRED: NORMAL
SVC: NORMAL
TLC PRE %PRED: 82 %
TLC PRED: NORMAL
TLC: NORMAL
VA %PRED: NORMAL
VA PRED: NORMAL
VA: NORMAL
VTG %PRED: NORMAL
VTG PRED: NORMAL
VTG: NORMAL

## 2024-12-11 PROCEDURE — 94729 DIFFUSING CAPACITY: CPT

## 2024-12-11 PROCEDURE — 94760 N-INVAS EAR/PLS OXIMETRY 1: CPT

## 2024-12-11 PROCEDURE — 71250 CT THORAX DX C-: CPT

## 2024-12-11 PROCEDURE — 94726 PLETHYSMOGRAPHY LUNG VOLUMES: CPT

## 2024-12-11 PROCEDURE — 94010 BREATHING CAPACITY TEST: CPT

## 2024-12-11 ASSESSMENT — PULMONARY FUNCTION TESTS
FEV1_PERCENT_PREDICTED_PRE: 89
FEV1/FVC_PRE: 0.84

## 2024-12-12 ENCOUNTER — TELEPHONE (OUTPATIENT)
Dept: INFECTIOUS DISEASES | Age: 39
End: 2024-12-12

## 2024-12-12 NOTE — TELEPHONE ENCOUNTER
Called patient     She was referred to me re lung nodule and +aspergillus galactomannan    Treatment deferred on low suspicion of active infection     Repeat CT chest -   No change in the right pulmonary nodules including the dominant well-defined solid right lower lobe nodule measuring 2.1 x 2.3 cm.  No new pulmonary nodules have developed.      D/w patient  From ID standpoint, very reassuring   There is no evidence of any active pulmonary infection for which treatment is indicated, though it remains uncertain the etiology of the stable nodule    Defer to Pulm for ongoing surveillance     Questions addressed  Call if questions arise

## 2024-12-14 NOTE — PROCEDURES
[address] [address]                           PULMONARY FUNCTION      PATIENT NAME: SHANDRA OLSEN                : 1985  MED REC NO: [id number]                      ROOM:   ACCOUNT NO: [id number]                       ADMIT DATE: 2024  PROVIDER: Dawit Domingo MD      DATE OF PROCEDURE: 2024    SURGEON:  Dawit Domingo MD    REFERRING PHYSICIAN:  DONAVAN LEVI    INDICATION:  Lung nodule.    INTERPRETATION:    1. Spirometry:  The FVC is 86% of predicted.  The FEV1 is 89% of predicted.  The FEV1/FVC ratio is 0.84.    2. Plethysmography:  The total lung capacity is 82% of predicted.    3. The diffusion capacity for carbon monoxide is 62% of predicted.    4. The flow volume loop is normal.    IMPRESSION:  This patient has normal spirometry with no obstructive defect.  The total lung capacity is normal.  The DLCO is mildly decreased, which is nonspecific in isolation but can represent emphysema or interstitial lung disease or pulmonary vascular disease, and clinical correlation is recommended.          DAWIT DOMINGO MD      D:  2024 15:43:30     T:  2024 02:15:08     /AQS  Job #:  [id number]     Doc#:  [id number]

## 2025-01-10 ENCOUNTER — TELEPHONE (OUTPATIENT)
Dept: INFECTIOUS DISEASES | Age: 40
End: 2025-01-10

## 2025-01-10 NOTE — TELEPHONE ENCOUNTER
Received referral for pt  Contacted pt and she stated she did not need the appt, she is following with pulm  Pt states she was told it was nothing infectuous

## 2025-02-28 ENCOUNTER — OFFICE VISIT (OUTPATIENT)
Dept: FAMILY MEDICINE CLINIC | Age: 40
End: 2025-02-28
Payer: COMMERCIAL

## 2025-02-28 VITALS
HEIGHT: 66 IN | TEMPERATURE: 97.7 F | SYSTOLIC BLOOD PRESSURE: 122 MMHG | OXYGEN SATURATION: 99 % | DIASTOLIC BLOOD PRESSURE: 68 MMHG | BODY MASS INDEX: 21.69 KG/M2 | WEIGHT: 135 LBS | HEART RATE: 96 BPM

## 2025-02-28 DIAGNOSIS — Z20.818 EXPOSURE TO STREP THROAT: ICD-10-CM

## 2025-02-28 DIAGNOSIS — J35.8 TONSILLAR CYST: Primary | ICD-10-CM

## 2025-02-28 LAB — S PYO AG THROAT QL: NORMAL

## 2025-02-28 PROCEDURE — 99213 OFFICE O/P EST LOW 20 MIN: CPT | Performed by: FAMILY MEDICINE

## 2025-02-28 PROCEDURE — 87880 STREP A ASSAY W/OPTIC: CPT | Performed by: FAMILY MEDICINE

## 2025-02-28 SDOH — ECONOMIC STABILITY: FOOD INSECURITY: WITHIN THE PAST 12 MONTHS, THE FOOD YOU BOUGHT JUST DIDN'T LAST AND YOU DIDN'T HAVE MONEY TO GET MORE.: NEVER TRUE

## 2025-02-28 SDOH — ECONOMIC STABILITY: FOOD INSECURITY: WITHIN THE PAST 12 MONTHS, YOU WORRIED THAT YOUR FOOD WOULD RUN OUT BEFORE YOU GOT MONEY TO BUY MORE.: NEVER TRUE

## 2025-02-28 ASSESSMENT — PATIENT HEALTH QUESTIONNAIRE - PHQ9: DEPRESSION UNABLE TO ASSESS: PT REFUSES

## 2025-02-28 NOTE — PROGRESS NOTES
Caitlin Penny is a 39 y.o. female    Chief Complaint   Patient presents with    Other     White spot on tonsil after being sick. Been there for two weeks        HPI:    HPI    White spot on tonsil. Present for 2 weeks.  She recently had a sore throat about 2 weeks ago which has resolved without antibiotics.  She had a runny nose at the time.  No trouble swallowing.  No history of tonsil stones.  She does not smoke or chew tobacco.    ROS:    Review of Systems   Constitutional:  Negative for chills and fever.       /68 (Site: Right Upper Arm, Position: Sitting, Cuff Size: Medium Adult)   Pulse 96   Temp 97.7 °F (36.5 °C) (Temporal)   Ht 1.676 m (5' 5.98\")   Wt 61.2 kg (135 lb)   LMP 02/13/2025   SpO2 99%   BMI 21.80 kg/m²     Physical Exam:    Physical Exam  Constitutional:       General: She is not in acute distress.     Appearance: Normal appearance. She is normal weight. She is not ill-appearing or toxic-appearing.   HENT:      Head: Normocephalic.      Mouth/Throat:      Mouth: Mucous membranes are moist.      Pharynx: Oropharynx is clear. No oropharyngeal exudate or posterior oropharyngeal erythema.      Comments: 2 tonsillar cysts on both sides.  No surrounding erythema.  Postnasal drip findings present.  Neurological:      Mental Status: She is alert.   Psychiatric:         Mood and Affect: Mood normal.         Behavior: Behavior normal.         Thought Content: Thought content normal.         Current Outpatient Medications   Medication Sig Dispense Refill    butalbital-acetaminophen-caffeine (FIORICET, ESGIC) -40 MG per tablet Take 1 tablet by mouth every 4 hours as needed for Headaches 180 tablet 3     No current facility-administered medications for this visit.       Assessment:    1. Tonsillar cyst    2. Exposure to strep throat        Plan:    1. Tonsillar cyst  I do not think this is a tonsil stone.  I think it is a tonsillar cyst.  I think she recently had a virus about 2 weeks ago.

## 2025-03-02 LAB — BACTERIA THROAT AEROBE CULT: NORMAL

## 2025-03-03 ENCOUNTER — PATIENT MESSAGE (OUTPATIENT)
Dept: PULMONOLOGY | Age: 40
End: 2025-03-03

## 2025-03-03 DIAGNOSIS — F41.9 ANXIETY: Primary | ICD-10-CM

## 2025-03-03 LAB — BACTERIA THROAT AEROBE CULT: NORMAL

## 2025-03-03 RX ORDER — DIAZEPAM 2 MG/1
4 TABLET ORAL EVERY 8 HOURS PRN
Qty: 2 TABLET | Refills: 0 | Status: SHIPPED | OUTPATIENT
Start: 2025-03-03 | End: 2025-03-04 | Stop reason: SDUPTHER

## 2025-03-04 DIAGNOSIS — F41.9 ANXIETY: ICD-10-CM

## 2025-03-04 RX ORDER — DIAZEPAM 2 MG/1
4 TABLET ORAL EVERY 8 HOURS PRN
Qty: 2 TABLET | Refills: 0 | Status: SHIPPED | OUTPATIENT
Start: 2025-03-04 | End: 2025-03-05

## 2025-03-04 NOTE — TELEPHONE ENCOUNTER
PET scan was rescheduled to next Tuesday. MINH Donovan said that he would refill valium.  Meds pended  please send again

## 2025-03-11 ENCOUNTER — HOSPITAL ENCOUNTER (OUTPATIENT)
Dept: PET IMAGING | Age: 40
Discharge: HOME | End: 2025-03-11
Attending: INTERNAL MEDICINE
Payer: COMMERCIAL

## 2025-03-11 DIAGNOSIS — R91.8 LUNG MASS: ICD-10-CM

## 2025-03-11 PROCEDURE — 3430000000 HC RX DIAGNOSTIC RADIOPHARMACEUTICAL: Performed by: INTERNAL MEDICINE

## 2025-03-11 PROCEDURE — A9609 HC RX DIAGNOSTIC RADIOPHARMACEUTICAL: HCPCS | Performed by: INTERNAL MEDICINE

## 2025-03-11 PROCEDURE — 78815 PET IMAGE W/CT SKULL-THIGH: CPT

## 2025-03-11 RX ORDER — FLUDEOXYGLUCOSE F 18 200 MCI/ML
13.02 INJECTION, SOLUTION INTRAVENOUS
Status: COMPLETED | OUTPATIENT
Start: 2025-03-11 | End: 2025-03-11

## 2025-03-11 RX ADMIN — FLUDEOXYGLUCOSE F 18 13.02 MILLICURIE: 200 INJECTION, SOLUTION INTRAVENOUS at 09:55

## 2025-03-12 ENCOUNTER — TELEPHONE (OUTPATIENT)
Dept: PULMONOLOGY | Age: 40
End: 2025-03-12

## 2025-03-12 DIAGNOSIS — R91.8 LUNG MASS: Primary | ICD-10-CM

## 2025-05-27 ENCOUNTER — TELEPHONE (OUTPATIENT)
Dept: PULMONOLOGY | Age: 40
End: 2025-05-27

## 2025-05-27 DIAGNOSIS — R91.8 LUNG MASS: Primary | ICD-10-CM

## 2025-05-27 NOTE — PROGRESS NOTES
I was able to reach Caitlin  This time discussed about why biopsy was not done, she was trying to use ivermectin and doxycycline in the hope to resolve and she wanted to wait to give it time  At this time I encouraged again and stressed out the importance of biopsy however she requested CAT scan to make sure it is still there, so we will do CAT scan and put stat referral to IP at  for biopsy as discussed with IP before at  may do cryo to get larger tissue    Caitlin expressed understanding at this time and she will go and follow-up with them after she know about the CAT scan

## 2025-05-27 NOTE — TELEPHONE ENCOUNTER
After multiple attempts to call the patient and reached to get update about if she had the biopsy at , finally I was able to reach her mother  I stressed the importance of the biopsy repeated as my concern is still slow-growing cancer or some type of lymphoma and that I already arrange for a biopsy at , however the patient did not feel she need to do it urgently despite I stressed after the PET scan that the biopsy is most and I already contacted IP at  and explained the case to them 3 months ago  Mother will talk to the daughter and we will try to send the biopsy stat to  to make sure it get done very soon  Mother aware on of the importance of the above instruction and we will be in contact me on my cell

## (undated) DEVICE — TUBING FLUIDICS BRONCHOSCOPE

## (undated) DEVICE — ENDO CARRY-ON PROCEDURE KIT INCLUDES SUCTION TUBING, LUBRICANT, GAUZE, BIOHAZARD STICKER, TRANSPORT PAD AND INTERCEPT BEDSIDE KIT.: Brand: ENDO CARRY-ON PROCEDURE KIT

## (undated) DEVICE — KIT INTRODUCER BRONCHOSCOPE PATIENT

## (undated) DEVICE — SYRINGE MED 50ML LUERSLIP TIP

## (undated) DEVICE — SHEET,DRAPE,40X58,STERILE: Brand: MEDLINE

## (undated) DEVICE — VALVE SHEATH BRONCHOSCOPE

## (undated) DEVICE — SINGLE USE SUCTION VALVE MAJ-209: Brand: SINGLE USE SUCTION VALVE (STERILE)

## (undated) DEVICE — NEEDLE ASPIR 21GA L700MM US GUID TREAT DST END FOR EFFICIENT

## (undated) DEVICE — NEBULIZER AEROSOL TBNG 7 FT FOR ACUTE CARE NEBUTECH

## (undated) DEVICE — ADAPTER TBNG DIA15MM SWVL FBROPT BRONCHSCP TERM 2 AXIS PEEP

## (undated) DEVICE — AIRLIFE™ ADULT AEROSOL MASK VINYL, UNDER-THE-CHIN STYLE: Brand: AIRLIFE™

## (undated) DEVICE — BLOCK BITE ENDOSCOPIC LF FM PROTCT

## (undated) DEVICE — SYRINGE 20ML LL S/C 50

## (undated) DEVICE — SINGLE USE BIOPSY VALVE MAJ-210: Brand: SINGLE USE BIOPSY VALVE (STERILE)

## (undated) DEVICE — SYRINGE MED 10ML SLIP TIP BLNT FILL AND LUERLOCK DISP